# Patient Record
Sex: FEMALE | Race: WHITE | NOT HISPANIC OR LATINO | Employment: FULL TIME | URBAN - METROPOLITAN AREA
[De-identification: names, ages, dates, MRNs, and addresses within clinical notes are randomized per-mention and may not be internally consistent; named-entity substitution may affect disease eponyms.]

---

## 2018-10-11 ENCOUNTER — OFFICE VISIT CONVERTED (OUTPATIENT)
Dept: SURGERY | Facility: CLINIC | Age: 43
End: 2018-10-11
Attending: PHYSICIAN ASSISTANT

## 2018-10-11 ENCOUNTER — CONVERSION ENCOUNTER (OUTPATIENT)
Dept: SURGERY | Facility: CLINIC | Age: 43
End: 2018-10-11

## 2018-10-18 ENCOUNTER — CONVERSION ENCOUNTER (OUTPATIENT)
Dept: SURGERY | Facility: CLINIC | Age: 43
End: 2018-10-18

## 2019-04-15 ENCOUNTER — HOSPITAL ENCOUNTER (OUTPATIENT)
Dept: URGENT CARE | Facility: CLINIC | Age: 44
Discharge: HOME | End: 2019-04-15
Attending: FAMILY MEDICINE

## 2019-05-25 ENCOUNTER — HOSPITAL ENCOUNTER (OUTPATIENT)
Dept: URGENT CARE | Facility: CLINIC | Age: 44
Discharge: HOME | End: 2019-05-25

## 2019-05-27 LAB
AMOXICILLIN+CLAV SUSC ISLT: 4
BACTERIA UR CULT: ABNORMAL
CEFAZOLIN SUSC ISLT: >=64
CEFEPIME SUSC ISLT: <=1
CEFTAZIDIME SUSC ISLT: <=1
CEFTRIAXONE SUSC ISLT: <=1
CEFUROXIME ORAL SUSC ISLT: 4
CEFUROXIME PARENTER SUSC ISLT: 4
CIPROFLOXACIN SUSC ISLT: <=0.25
ERTAPENEM SUSC ISLT: <=0.5
GENTAMICIN SUSC ISLT: <=1
LEVOFLOXACIN SUSC ISLT: <=0.12
NITROFURANTOIN SUSC ISLT: 32
TETRACYCLINE SUSC ISLT: <=1
TMP SMX SUSC ISLT: <=20
TOBRAMYCIN SUSC ISLT: <=1

## 2020-01-05 ENCOUNTER — HOSPITAL ENCOUNTER (OUTPATIENT)
Dept: URGENT CARE | Facility: CLINIC | Age: 45
Discharge: HOME | End: 2020-01-05

## 2020-06-26 ENCOUNTER — HOSPITAL ENCOUNTER (OUTPATIENT)
Dept: URGENT CARE | Facility: CLINIC | Age: 45
Discharge: HOME | End: 2020-06-26
Attending: PHYSICIAN ASSISTANT

## 2021-05-16 VITALS — HEIGHT: 66 IN | WEIGHT: 158.5 LBS | RESPIRATION RATE: 12 BRPM | BODY MASS INDEX: 25.47 KG/M2

## 2021-05-16 VITALS — RESPIRATION RATE: 12 BRPM | WEIGHT: 160.25 LBS | HEIGHT: 66 IN | BODY MASS INDEX: 25.75 KG/M2

## 2023-04-06 ENCOUNTER — APPOINTMENT (OUTPATIENT)
Dept: CT IMAGING | Facility: HOSPITAL | Age: 48
End: 2023-04-06
Payer: COMMERCIAL

## 2023-04-06 ENCOUNTER — HOSPITAL ENCOUNTER (EMERGENCY)
Facility: HOSPITAL | Age: 48
Discharge: HOME | End: 2023-04-06
Attending: EMERGENCY MEDICINE | Admitting: EMERGENCY MEDICINE
Payer: COMMERCIAL

## 2023-04-06 VITALS
OXYGEN SATURATION: 100 % | DIASTOLIC BLOOD PRESSURE: 100 MMHG | RESPIRATION RATE: 16 BRPM | WEIGHT: 172.18 LBS | HEIGHT: 66 IN | HEART RATE: 80 BPM | SYSTOLIC BLOOD PRESSURE: 173 MMHG | BODY MASS INDEX: 27.67 KG/M2 | TEMPERATURE: 97.9 F

## 2023-04-06 DIAGNOSIS — R51.9 NONINTRACTABLE HEADACHE, UNSPECIFIED CHRONICITY PATTERN, UNSPECIFIED HEADACHE TYPE: Primary | ICD-10-CM

## 2023-04-06 PROCEDURE — 96374 THER/PROPH/DIAG INJ IV PUSH: CPT

## 2023-04-06 PROCEDURE — 25010000002 METOCLOPRAMIDE PER 10 MG: Performed by: EMERGENCY MEDICINE

## 2023-04-06 PROCEDURE — 99283 EMERGENCY DEPT VISIT LOW MDM: CPT

## 2023-04-06 PROCEDURE — 25010000002 DIPHENHYDRAMINE PER 50 MG: Performed by: EMERGENCY MEDICINE

## 2023-04-06 PROCEDURE — 25010000002 KETOROLAC TROMETHAMINE PER 15 MG: Performed by: EMERGENCY MEDICINE

## 2023-04-06 PROCEDURE — 96375 TX/PRO/DX INJ NEW DRUG ADDON: CPT

## 2023-04-06 PROCEDURE — 70450 CT HEAD/BRAIN W/O DYE: CPT

## 2023-04-06 RX ORDER — METOCLOPRAMIDE HYDROCHLORIDE 5 MG/ML
10 INJECTION INTRAMUSCULAR; INTRAVENOUS ONCE
Status: COMPLETED | OUTPATIENT
Start: 2023-04-06 | End: 2023-04-06

## 2023-04-06 RX ORDER — KETOROLAC TROMETHAMINE 30 MG/ML
30 INJECTION, SOLUTION INTRAMUSCULAR; INTRAVENOUS ONCE
Status: COMPLETED | OUTPATIENT
Start: 2023-04-06 | End: 2023-04-06

## 2023-04-06 RX ORDER — ACETAMINOPHEN 500 MG
1000 TABLET ORAL ONCE
Status: COMPLETED | OUTPATIENT
Start: 2023-04-06 | End: 2023-04-06

## 2023-04-06 RX ORDER — DIPHENHYDRAMINE HYDROCHLORIDE 50 MG/ML
12.5 INJECTION INTRAMUSCULAR; INTRAVENOUS ONCE
Status: COMPLETED | OUTPATIENT
Start: 2023-04-06 | End: 2023-04-06

## 2023-04-06 RX ORDER — KETOROLAC TROMETHAMINE 10 MG/1
10 TABLET, FILM COATED ORAL EVERY 6 HOURS PRN
Qty: 15 TABLET | Refills: 0 | Status: SHIPPED | OUTPATIENT
Start: 2023-04-06 | End: 2023-04-06

## 2023-04-06 RX ADMIN — DIPHENHYDRAMINE HYDROCHLORIDE 12.5 MG: 50 INJECTION, SOLUTION INTRAMUSCULAR; INTRAVENOUS at 10:11

## 2023-04-06 RX ADMIN — KETOROLAC TROMETHAMINE 30 MG: 30 INJECTION, SOLUTION INTRAMUSCULAR; INTRAVENOUS at 10:14

## 2023-04-06 RX ADMIN — ACETAMINOPHEN 1000 MG: 500 TABLET ORAL at 10:14

## 2023-04-06 RX ADMIN — SODIUM CHLORIDE 1000 ML: 9 INJECTION, SOLUTION INTRAVENOUS at 10:11

## 2023-04-06 RX ADMIN — METOCLOPRAMIDE 10 MG: 5 INJECTION, SOLUTION INTRAMUSCULAR; INTRAVENOUS at 10:13

## 2023-04-06 NOTE — ED PROVIDER NOTES
Time: 7:59 AM EDT  Date of encounter:  4/6/2023  Independent Historian/Clinical History and Information was obtained by:   Patient  Chief Complaint: headache    History is limited by: N/A    History of Present Illness:  Patient is a 47 y.o. year old female who presents to the emergency department for evaluation of headache, migraine for last few days. Patient states has hx of migraines and is now having trouble opening her eyes due to pressure and watery drainage.      History provided by:  Patient   used: No    Headache  Associated symptoms: no abdominal pain, no congestion, no cough, no diarrhea, no eye pain, no fever, no myalgias, no nausea, no seizures, no sore throat and no vomiting        Patient Care Team  Primary Care Provider: Provider, No Known    Past Medical History:     Allergies   Allergen Reactions   • Penicillins Hives and Rash     Past Medical History:   Diagnosis Date   • GERD (gastroesophageal reflux disease)      Past Surgical History:   Procedure Laterality Date   • APPENDECTOMY     • CHOLECYSTECTOMY     • TONSILLECTOMY     • WISDOM TOOTH EXTRACTION Bilateral      History reviewed. No pertinent family history.    Home Medications:  Prior to Admission medications    Medication Sig Start Date End Date Taking? Authorizing Provider   omeprazole (priLOSEC) 20 MG capsule Take 20 mg by mouth Daily.    Provider, Historical, MD        Social History:   Social History     Tobacco Use   • Smoking status: Never   • Smokeless tobacco: Never   Vaping Use   • Vaping Use: Never used   Substance Use Topics   • Alcohol use: Yes     Comment: social         Review of Systems:  Review of Systems   Constitutional: Negative for chills and fever.   HENT: Negative for congestion, rhinorrhea and sore throat.    Eyes: Positive for discharge (watery). Negative for pain and visual disturbance.        Pressure    Respiratory: Negative for apnea, cough, chest tightness and shortness of breath.   "  Cardiovascular: Negative for chest pain and palpitations.   Gastrointestinal: Negative for abdominal pain, diarrhea, nausea and vomiting.   Genitourinary: Negative for difficulty urinating and dysuria.   Musculoskeletal: Negative for joint swelling and myalgias.   Skin: Negative for color change.   Neurological: Positive for headaches. Negative for seizures.   Psychiatric/Behavioral: Negative.    All other systems reviewed and are negative.       Physical Exam:  /100 (BP Location: Right arm, Patient Position: Sitting)   Pulse 80   Temp 97.9 °F (36.6 °C) (Oral)   Resp 16   Ht 167.6 cm (66\")   Wt 78.1 kg (172 lb 2.9 oz)   LMP 03/22/2023   SpO2 100%   BMI 27.79 kg/m²     Physical Exam  Vitals and nursing note reviewed.   Constitutional:       General: She is not in acute distress.     Appearance: Normal appearance. She is not toxic-appearing.   HENT:      Head: Normocephalic and atraumatic.      Jaw: There is normal jaw occlusion.   Eyes:      General: Lids are normal.      Extraocular Movements: Extraocular movements intact.      Conjunctiva/sclera: Conjunctivae normal.      Pupils: Pupils are equal, round, and reactive to light.   Cardiovascular:      Rate and Rhythm: Normal rate and regular rhythm.      Pulses: Normal pulses.      Heart sounds: Normal heart sounds.   Pulmonary:      Effort: Pulmonary effort is normal. No respiratory distress.      Breath sounds: Normal breath sounds. No wheezing or rhonchi.   Abdominal:      General: Abdomen is flat.      Palpations: Abdomen is soft.      Tenderness: There is no abdominal tenderness. There is no guarding or rebound.   Musculoskeletal:         General: Normal range of motion.      Cervical back: Normal range of motion and neck supple.      Right lower leg: No edema.      Left lower leg: No edema.   Skin:     General: Skin is warm and dry.   Neurological:      Mental Status: She is alert and oriented to person, place, and time. Mental status is at " baseline.   Psychiatric:         Mood and Affect: Mood normal.                  Procedures:  Procedures      Medical Decision Making:      Comorbidities that affect care:    Migraine headache    External Notes reviewed:    Previous Clinic Note: Patient was recently seen for urinary tract infection by her PCP.      The following orders were placed and all results were independently analyzed by me:  Orders Placed This Encounter   Procedures   • CT Head Without Contrast       Medications Given in the Emergency Department:  Medications   ketorolac (TORADOL) injection 30 mg (30 mg Intravenous Given 4/6/23 1014)   acetaminophen (TYLENOL) tablet 1,000 mg (1,000 mg Oral Given 4/6/23 1014)   sodium chloride 0.9 % bolus 1,000 mL (1,000 mL Intravenous New Bag 4/6/23 1011)   metoclopramide (REGLAN) injection 10 mg (10 mg Intravenous Given 4/6/23 1013)   diphenhydrAMINE (BENADRYL) injection 12.5 mg (12.5 mg Intravenous Given 4/6/23 1011)        ED Course:         Labs:    Lab Results (last 24 hours)     ** No results found for the last 24 hours. **           Imaging:    CT Head Without Contrast    Result Date: 4/6/2023  PROCEDURE: CT HEAD WO CONTRAST  COMPARISON:  None  INDICATIONS: headache  PROTOCOL:   Standard imaging protocol performed    RADIATION:   DLP: 954.5mGy*cm   MA and/or KV was adjusted to minimize radiation dose.    TECHNIQUE: CT images were obtained without non-ionic intravenous contrast material.  FINDINGS:  The ventricles are normal in size, position, and configuration.  Sulci are not abnormally prominent.  No abnormal gray or white matter density is appreciated.  There is no CT evidence of acute intracranial hemorrhage, mass, or mass effect.  The orbits have a normal appearance.  The paranasal sinuses, middle ears, and mastoid air cells are well aerated.       Negative CT scan of the head without IV contrast.     CHAPINCITO LAURENT MD       Electronically Signed and Approved By: CHAPINCITO LAURENT MD on 4/06/2023 at  10:15                 Differential Diagnosis and Discussion:    Headache: Differential diagnosis includes but is not limited to migraine, cluster headache, hypertension, tumor, subarachnoid bleeding, pseudotumor cerebri, temporal arteritis, infections, tension headache, and TMJ syndrome.    CT scan radiology interpretation was reviewed by me.    MDM  Number of Diagnoses or Management Options  Nonintractable headache, unspecified chronicity pattern, unspecified headache type  Diagnosis management comments: Based on the patient's presentation of a headache and history of migraines, I conducted a thorough evaluation of her condition, which included a physical examination and a CT scan of her head. The patient reported having this migraine for approximately 5 days without any subjective neurological deficits. The CT scan did not show any acute intracranial abnormalities.    The differential diagnoses for headache include but are not limited to:    Migraine  Cluster headache  Tension headache  Sinus headache  Trigeminal neuralgia  Temporal arteritis  Head injury  Brain tumor  After administering Toradol, the patient reported cessation of the headache, and the re-examination showed no abnormal findings. Based on the patient's history and the response to Toradol, it is likely that the patient had a migraine.    I believe that the patient may be currently experiencing an ocular migraine, which is a type of migraine that involves visual symptoms without the presence of a headache. The signs and symptoms of an ocular migraine can include:    Visual disturbances, such as flashes of light, blind spots, or zigzag patterns  Numbness or tingling in the face, arms, or legs  Headache (although not always present)  Sensitivity to light or sound  Difficulty speaking or understanding speech  Based on the patient's current clinical presentation and the response to treatment, the patient is stable and suitable for discharge. However, I  strongly advise the patient to follow up with a neurologist for further evaluation and management of her condition. We will provide the patient with appropriate instructions and referrals for follow-up care, as well as a contact number for any questions or concerns she may have.       Amount and/or Complexity of Data Reviewed  Tests in the radiology section of CPT®: reviewed  Independent visualization of images, tracings, or specimens: yes    Risk of Complications, Morbidity, and/or Mortality  Presenting problems: moderate  Management options: moderate    Patient Progress  Patient progress: stable           Patient Care Considerations:    MRI: I considered ordering an MRI however Patient does report cessation of headache and has a normal CT scan.      Consultants/Shared Management Plan:    None    Social Determinants of Health:    Patient is independent, reliable, and has access to care.       Disposition and Care Coordination:    Discharged: The patient is suitable and stable for discharge with no need for consideration of observation or admission.    I have explained the patient´s condition, diagnoses and treatment plan based on the information available to me at this time. I have answered questions and addressed any concerns. The patient has a good  understanding of the patient´s diagnosis, condition, and treatment plan as can be expected at this point. The vital signs have been stable. The patient´s condition is stable and appropriate for discharge from the emergency department.      The patient will pursue further outpatient evaluation with the primary care physician or other designated or consulting physician as outlined in the discharge instructions. They are agreeable to this plan of care and follow-up instructions have been explained in detail. The patient has received these instructions in written format and have expressed an understanding of the discharge instructions. The patient is aware that any  significant change in condition or worsening of symptoms should prompt an immediate return to this or the closest emergency department or call to 911.  I have explained discharge medications and the need for follow up with the patient/caretakers. This was also printed in the discharge instructions. Patient was discharged with the following medications and follow up:      Medication List      New Prescriptions    ketorolac 10 MG tablet  Commonly known as: TORADOL  Take 1 tablet by mouth Every 6 (Six) Hours As Needed for Moderate Pain.           Where to Get Your Medications      These medications were sent to [id number] - [place], [place] - [address] AT [facility] - [phone]  - [phone] FX  [address] [place] [address]    Phone: [phone]   · ketorolac 10 MG tablet      Oropilla, Jose David Bryan MD  [address]  [place] [id number] [phone]    In 2 days         Final diagnoses:   Nonintractable headache, unspecified chronicity pattern, unspecified headache type        ED Disposition     ED Disposition   Discharge    Condition   Stable    Comment   --             This medical record created using voice recognition software.        Documentation assistance provided by Sidney Johnston acting as scribe for Usman Vasquez MD. Information recorded by the scribe was done at my direction and has been verified and validated by me.          Sidney Johnston  04/06/23 0759       Sidney Johnston  04/06/23 0829       Sidney Johnston  04/06/23 0833       Usman Vasquez MD  04/06/23 2738

## 2023-09-01 DIAGNOSIS — R30.0 DYSURIA: Primary | ICD-10-CM

## 2023-09-01 DIAGNOSIS — R30.0 DYSURIA: ICD-10-CM

## 2023-09-01 PROCEDURE — 87086 URINE CULTURE/COLONY COUNT: CPT | Performed by: NURSE PRACTITIONER

## 2023-09-01 PROCEDURE — 87077 CULTURE AEROBIC IDENTIFY: CPT | Performed by: NURSE PRACTITIONER

## 2023-09-01 PROCEDURE — 87186 SC STD MICRODIL/AGAR DIL: CPT | Performed by: NURSE PRACTITIONER

## 2023-09-01 RX ORDER — PHENAZOPYRIDINE HYDROCHLORIDE 200 MG/1
200 TABLET, FILM COATED ORAL 3 TIMES DAILY PRN
Qty: 6 TABLET | Refills: 0 | Status: SHIPPED | OUTPATIENT
Start: 2023-09-01

## 2023-09-03 ENCOUNTER — DOCUMENTATION (OUTPATIENT)
Dept: UROLOGY | Facility: CLINIC | Age: 48
End: 2023-09-03
Payer: COMMERCIAL

## 2023-09-03 DIAGNOSIS — R30.0 DYSURIA: Primary | ICD-10-CM

## 2023-09-03 LAB — BACTERIA SPEC AEROBE CULT: ABNORMAL

## 2023-09-03 RX ORDER — SULFAMETHOXAZOLE AND TRIMETHOPRIM 800; 160 MG/1; MG/1
1 TABLET ORAL 2 TIMES DAILY
Qty: 20 TABLET | Refills: 0 | Status: SHIPPED | OUTPATIENT
Start: 2023-09-03 | End: 2023-09-13

## 2023-10-16 ENCOUNTER — OFFICE VISIT (OUTPATIENT)
Dept: FAMILY MEDICINE CLINIC | Facility: CLINIC | Age: 48
End: 2023-10-16
Payer: COMMERCIAL

## 2023-10-16 ENCOUNTER — TRANSCRIBE ORDERS (OUTPATIENT)
Dept: ADMINISTRATIVE | Facility: HOSPITAL | Age: 48
End: 2023-10-16
Payer: COMMERCIAL

## 2023-10-16 ENCOUNTER — LAB (OUTPATIENT)
Dept: LAB | Facility: HOSPITAL | Age: 48
End: 2023-10-16
Payer: COMMERCIAL

## 2023-10-16 VITALS
DIASTOLIC BLOOD PRESSURE: 98 MMHG | HEART RATE: 106 BPM | BODY MASS INDEX: 29.41 KG/M2 | HEIGHT: 66 IN | SYSTOLIC BLOOD PRESSURE: 140 MMHG | WEIGHT: 183 LBS | OXYGEN SATURATION: 97 %

## 2023-10-16 DIAGNOSIS — G43.009 MIGRAINE WITHOUT AURA AND WITHOUT STATUS MIGRAINOSUS, NOT INTRACTABLE: ICD-10-CM

## 2023-10-16 DIAGNOSIS — E66.3 OVERWEIGHT (BMI 25.0-29.9): ICD-10-CM

## 2023-10-16 DIAGNOSIS — R03.0 ELEVATED BP WITHOUT DIAGNOSIS OF HYPERTENSION: ICD-10-CM

## 2023-10-16 DIAGNOSIS — Z12.31 BREAST CANCER SCREENING BY MAMMOGRAM: Primary | ICD-10-CM

## 2023-10-16 DIAGNOSIS — Z12.11 ENCOUNTER FOR SCREENING COLONOSCOPY: ICD-10-CM

## 2023-10-16 DIAGNOSIS — Z12.31 SCREENING MAMMOGRAM FOR BREAST CANCER: ICD-10-CM

## 2023-10-16 DIAGNOSIS — R00.2 PALPITATIONS: ICD-10-CM

## 2023-10-16 DIAGNOSIS — Z11.59 ENCOUNTER FOR HEPATITIS C SCREENING TEST FOR LOW RISK PATIENT: ICD-10-CM

## 2023-10-16 DIAGNOSIS — R00.2 PALPITATIONS: Primary | ICD-10-CM

## 2023-10-16 DIAGNOSIS — K21.9 GASTROESOPHAGEAL REFLUX DISEASE WITHOUT ESOPHAGITIS: ICD-10-CM

## 2023-10-16 PROBLEM — G43.909 MIGRAINE: Status: ACTIVE | Noted: 2023-10-16

## 2023-10-16 LAB
ALBUMIN SERPL-MCNC: 4.7 G/DL (ref 3.5–5.2)
ALBUMIN/GLOB SERPL: 1.5 G/DL
ALP SERPL-CCNC: 63 U/L (ref 39–117)
ALT SERPL W P-5'-P-CCNC: 31 U/L (ref 1–33)
ANION GAP SERPL CALCULATED.3IONS-SCNC: 11.1 MMOL/L (ref 5–15)
AST SERPL-CCNC: 28 U/L (ref 1–32)
BASOPHILS # BLD AUTO: 0.06 10*3/MM3 (ref 0–0.2)
BASOPHILS NFR BLD AUTO: 1 % (ref 0–1.5)
BILIRUB SERPL-MCNC: 0.2 MG/DL (ref 0–1.2)
BUN SERPL-MCNC: 12 MG/DL (ref 6–20)
BUN/CREAT SERPL: 15.2 (ref 7–25)
CALCIUM SPEC-SCNC: 9.9 MG/DL (ref 8.6–10.5)
CHLORIDE SERPL-SCNC: 105 MMOL/L (ref 98–107)
CHOLEST SERPL-MCNC: 300 MG/DL (ref 0–200)
CO2 SERPL-SCNC: 23.9 MMOL/L (ref 22–29)
CREAT SERPL-MCNC: 0.79 MG/DL (ref 0.57–1)
DEPRECATED RDW RBC AUTO: 43 FL (ref 37–54)
EGFRCR SERPLBLD CKD-EPI 2021: 91.8 ML/MIN/1.73
EOSINOPHIL # BLD AUTO: 0.06 10*3/MM3 (ref 0–0.4)
EOSINOPHIL NFR BLD AUTO: 1 % (ref 0.3–6.2)
ERYTHROCYTE [DISTWIDTH] IN BLOOD BY AUTOMATED COUNT: 14.2 % (ref 12.3–15.4)
GLOBULIN UR ELPH-MCNC: 3.1 GM/DL
GLUCOSE SERPL-MCNC: 103 MG/DL (ref 65–99)
HBA1C MFR BLD: 5.9 % (ref 4.8–5.6)
HCT VFR BLD AUTO: 36 % (ref 34–46.6)
HCV AB SER DONR QL: NORMAL
HDLC SERPL-MCNC: 58 MG/DL (ref 40–60)
HGB BLD-MCNC: 11.7 G/DL (ref 12–15.9)
IMM GRANULOCYTES # BLD AUTO: 0.03 10*3/MM3 (ref 0–0.05)
IMM GRANULOCYTES NFR BLD AUTO: 0.5 % (ref 0–0.5)
LDLC SERPL CALC-MCNC: 207 MG/DL (ref 0–100)
LDLC/HDLC SERPL: 3.54 {RATIO}
LYMPHOCYTES # BLD AUTO: 1.68 10*3/MM3 (ref 0.7–3.1)
LYMPHOCYTES NFR BLD AUTO: 27 % (ref 19.6–45.3)
MAGNESIUM SERPL-MCNC: 2.3 MG/DL (ref 1.6–2.6)
MCH RBC QN AUTO: 27.3 PG (ref 26.6–33)
MCHC RBC AUTO-ENTMCNC: 32.5 G/DL (ref 31.5–35.7)
MCV RBC AUTO: 84.1 FL (ref 79–97)
MONOCYTES # BLD AUTO: 0.55 10*3/MM3 (ref 0.1–0.9)
MONOCYTES NFR BLD AUTO: 8.8 % (ref 5–12)
NEUTROPHILS NFR BLD AUTO: 3.85 10*3/MM3 (ref 1.7–7)
NEUTROPHILS NFR BLD AUTO: 61.7 % (ref 42.7–76)
NRBC BLD AUTO-RTO: 0 /100 WBC (ref 0–0.2)
PLATELET # BLD AUTO: 311 10*3/MM3 (ref 140–450)
PMV BLD AUTO: 9.7 FL (ref 6–12)
POTASSIUM SERPL-SCNC: 4.5 MMOL/L (ref 3.5–5.2)
PROT SERPL-MCNC: 7.8 G/DL (ref 6–8.5)
RBC # BLD AUTO: 4.28 10*6/MM3 (ref 3.77–5.28)
SODIUM SERPL-SCNC: 140 MMOL/L (ref 136–145)
T4 FREE SERPL-MCNC: 0.99 NG/DL (ref 0.93–1.7)
TRIGL SERPL-MCNC: 183 MG/DL (ref 0–150)
TSH SERPL DL<=0.05 MIU/L-ACNC: 2.21 UIU/ML (ref 0.27–4.2)
VLDLC SERPL-MCNC: 35 MG/DL (ref 5–40)
WBC NRBC COR # BLD: 6.23 10*3/MM3 (ref 3.4–10.8)

## 2023-10-16 PROCEDURE — 83036 HEMOGLOBIN GLYCOSYLATED A1C: CPT

## 2023-10-16 PROCEDURE — 80061 LIPID PANEL: CPT

## 2023-10-16 PROCEDURE — 83735 ASSAY OF MAGNESIUM: CPT

## 2023-10-16 PROCEDURE — 36415 COLL VENOUS BLD VENIPUNCTURE: CPT

## 2023-10-16 PROCEDURE — 84439 ASSAY OF FREE THYROXINE: CPT

## 2023-10-16 PROCEDURE — 86803 HEPATITIS C AB TEST: CPT

## 2023-10-16 PROCEDURE — 80050 GENERAL HEALTH PANEL: CPT

## 2023-10-16 RX ORDER — FAMOTIDINE 20 MG/1
20 TABLET, FILM COATED ORAL 2 TIMES DAILY
Qty: 60 TABLET | Refills: 2 | Status: SHIPPED | OUTPATIENT
Start: 2023-10-16

## 2023-10-16 NOTE — PROGRESS NOTES
Subjective:     Tali Mcduffie is a 48 y.o. female with a concurrent medical history of gastroesophageal reflux disease who presents to discuss heart palpitations and headaches.    Ms. Orta complains of intermittent heart palpitations of two weeks duration. She is not a smoker.  She denies known heart condition but does say she has been treated with medication in the past for similar palpitations though she is unable to recall the name of this medicine.  Heart rate initially 126, decreased to 106 on recheck.  Tachycardic on physical exam.  Denies syncope, chest pain, shortness of breath, lightheadedness, weakness, fatigue.    ECG obtained in clinic and demonstrated sinus rhythm with left bundle branch block.  Will obtain magnesium and thyroid studies. Will order 48-hour Holter monitor provide patient with referral to cardiology at her request.    I am starting patient on metoprolol for migraines and this may incidentally help with heart rate as well.  I will tell her to wait to start taking this medication until after her Holter monitor.    Patient does complain of headaches.  She tells me she was diagnosed with migraine in 2001 and has been on multiple medications but is unable to remember what medications she was prescribed.  She says her headaches have been becoming more frequent lately and now she has up to 2/week.  She denies photophobia, sensitivity to sound, nausea and vomiting.  Sleep does not help she can go to bed and still wake up with a headache.  She does not have any aura.  The headaches can last for several hours at a time but do not have any cluster pattern including lacrimation or tearing.  The headaches can be pulsatile in nature.  She believes she has been on Topamax for prophylaxis but is not quite sure and she does not think this made a difference.  She had been on Imitrex for abortive therapy that would make the headache go away but would result in a migraine headache.  She usually  self medicates when she feels a headache coming out with Tylenol and monster.    Given her concomitant tachycardia and slightly elevated blood pressure today, would start with metoprolol tartrate 25 mg twice daily.  Discussed potential side effects risk and benefits of this medication with patient.  She is to wait to start taking until after her Holter monitor so it does not affect the study.  We will see back in 1 month.  If patient does have some improvement, would consider increasing dose if needed versus without improvement switching to a different medication.     Headaches do not fit typical migraine pattern as she has complete absence of photophobia, phonophobia, and nausea or vomiting. May warrant consideration of trycyclic for chronic tension headaches if no benefit from metoprolol.     We discussed red flag symptoms that would be reason for urgent imaging. Patient had CT in 4/06/2023 that was normal.     Patient did have elevated BP.  Initial systolic value was over 150 and on recheck improved to 140.  Chronically elevated blood pressure can cause headaches and I did recommend to patient to monitor her blood pressure in the ambulatory setting as she does attribute elevation of blood pressure to whitecoat hypertension.  If blood pressure is consistently elevated outside of this office setting and remains elevated at next visit, it would be worth revisiting potential for diagnosis of hypertension though I think that since we are starting her on metoprolol blood pressure may improve incidentally with this as well.    Gastroesophageal reflux disease - Patient has been on omeprazole for some time.  We did discuss the risk of osteoporosis with long-term PPI use and patient did elect to try to come off this medicine and have trial of Pepcid instead.  Prescription sent.    Care gaps include Pap smear and we will get her set up with [facility] in our office for this.    Care gaps include screening colonoscopy and patient  is agreeable to obtain this.  We will get her set up with a referral for general surgery for colonoscopy today.    Care gaps include hepatitis C screening and we will obtain this today.    The following portions of the patient's history were reviewed and updated as appropriate: allergies, current medications, past family history, past medical history, past social history, past surgical history, and problem list.    Past Medical Hx:  Past Medical History:   Diagnosis Date    GERD (gastroesophageal reflux disease)        Past Surgical Hx:  Past Surgical History:   Procedure Laterality Date    APPENDECTOMY      CHOLECYSTECTOMY      TONSILLECTOMY      WISDOM TOOTH EXTRACTION Bilateral        Current Meds:    Current Outpatient Medications:     famotidine (Pepcid) 20 MG tablet, Take 1 tablet by mouth 2 (Two) Times a Day., Disp: 60 tablet, Rfl: 2    metoprolol tartrate (LOPRESSOR) 25 MG tablet, Take 1 tablet by mouth 2 (Two) Times a Day., Disp: 60 tablet, Rfl: 1    Allergies:  Allergies   Allergen Reactions    Penicillins Hives and Rash       Family Hx:  No family history on file.     Social History:  Social History     Socioeconomic History    Marital status: Single   Tobacco Use    Smoking status: Never    Smokeless tobacco: Never   Vaping Use    Vaping Use: Never used   Substance and Sexual Activity    Alcohol use: Yes     Comment: social    Drug use: Not Currently    Sexual activity: Defer       Review of Systems  Review of Systems   Constitutional:  Positive for fatigue. Negative for chills and fever.   Eyes:  Negative for photophobia.   Respiratory:  Negative for shortness of breath.    Cardiovascular:  Negative for chest pain.   Gastrointestinal:  Negative for abdominal pain, nausea and vomiting.   Endocrine: Negative for polydipsia, polyphagia and polyuria.   Genitourinary:  Negative for difficulty urinating and dysuria.   Neurological:  Positive for headaches. Negative for dizziness, syncope, weakness and  "light-headedness.       Objective:     /98   Pulse 106   Ht 167.6 cm (66\")   Wt 83 kg (183 lb)   LMP 10/12/2023 (Exact Date)   SpO2 97%   BMI 29.54 kg/m²   Physical Exam  Constitutional:       General: She is not in acute distress.     Appearance: Normal appearance. She is not ill-appearing, toxic-appearing or diaphoretic.   HENT:      Head: Normocephalic and atraumatic.   Eyes:      Extraocular Movements: Extraocular movements intact.      Pupils: Pupils are equal, round, and reactive to light.   Cardiovascular:      Rate and Rhythm: Regular rhythm. Tachycardia present.   Pulmonary:      Effort: Pulmonary effort is normal.      Breath sounds: Normal breath sounds.   Musculoskeletal:      Cervical back: Normal range of motion.   Skin:     General: Skin is warm and dry.   Neurological:      Mental Status: She is alert.   Psychiatric:         Mood and Affect: Mood normal.         Behavior: Behavior normal.          Assessment/Plan:     Diagnoses and all orders for this visit:    1. Palpitations (Primary)    Ms. Orta complains of intermittent heart palpitations of two weeks duration. She is not a smoker.  She denies known heart condition but does say she has been treated with medication in the past for similar palpitations though she is unable to recall the name of this medicine.  Heart rate initially 126, decreased to 106 on recheck.  Tachycardic on physical exam.  Denies syncope, chest pain, shortness of breath, lightheadedness, weakness, fatigue.    ECG obtained in clinic and demonstrated sinus rhythm with left bundle branch block.  Will obtain magnesium and thyroid studies. Will order 48-hour Holter monitor provide patient with referral to cardiology at her request. No prior EKG data available.     I am starting patient on metoprolol for migraines and this may incidentally help with heart rate as well.  I will tell her to wait to start taking this medication until after her Holter " monitor.    -Cardiology referral   -     TSH+Free T4; Future  -     ECG 12 Lead  -     Magnesium; Future  -     CBC w AUTO Differential; Future  -     Holter monitor - 48 hour; Future    2. Overweight (BMI 25.0-29.9)    Given BMI, will obtain lab work to screen for diabetes and cholesterol.     -     Hemoglobin A1c; Future  -     Lipid panel; Future    3. Migraine without aura and without status migrainosus, not intractable    Patient does complain of headaches.  She tells me she was diagnosed with migraine in 2001 and has been on multiple medications but is unable to remember what medications she was prescribed.  She says her headaches have been becoming more frequent lately and now she has up to 2/week.  She denies photophobia, sensitivity to sound, nausea and vomiting.  Sleep does not help she can go to bed and still wake up with a headache.  She does not have any aura.  The headaches can last for several hours at a time but do not have any cluster pattern including lacrimation or tearing.  The headaches can be pulsatile in nature.  She believes she has been on Topamax for prophylaxis but is not quite sure and she does not think this made a difference.  She had been on Imitrex for abortive therapy that would make the headache go away but would result in a migraine headache.  She usually self medicates when she feels a headache coming out with Tylenol and monster.    Given her concomitant tachycardia and slightly elevated blood pressure today, would start with metoprolol tartrate 25 mg twice daily.  Discussed potential side effects risk and benefits of this medication with patient.  She is to wait to start taking until after her Holter monitor so it does not affect the study.  We will see back in 1 month.  If patient does have some improvement, would consider increasing dose if needed versus without improvement switching to a different medication.     Headaches do not fit typical migraine pattern as she has  complete absence of photophobia, phonophobia, and nausea or vomiting. May warrant consideration of trycyclic for chronic tension headaches if no benefit from metoprolol.     We discussed red flag symptoms that would be reason for urgent imaging. Patient had CT in 4/06/2023 that was normal.     -     Comprehensive metabolic panel; Future  -     metoprolol tartrate (LOPRESSOR) 25 MG tablet; Take 1 tablet by mouth 2 (Two) Times a Day.  Dispense: 60 tablet; Refill: 1    4. Encounter for hepatitis C screening test for low risk patient  Care gaps include hepatitis C screening and we will obtain this today.    -     Hepatitis C antibody; Future    5. Encounter for screening colonoscopy  Care gaps include screening colonoscopy and patient is agreeable to obtain this.  We will get her set up with a referral for general surgery for colonoscopy today.    -     Ambulatory Referral For Screening Colonoscopy    6. Screening mammogram for breast cancer  Patient requests screening mammogram to be ordered for when she turns 49 next year. Have placed order.     -     Mammo Screening Bilateral With CAD; Future    7. Gastroesophageal reflux disease without esophagitis    Gastroesophageal reflux disease - Patient has been on omeprazole for some time.  We did discuss the risk of osteoporosis with long-term PPI use and patient did elect to try to come off this medicine and have trial of Pepcid instead.  Prescription sent.    -     famotidine (Pepcid) 20 MG tablet; Take 1 tablet by mouth 2 (Two) Times a Day.  Dispense: 60 tablet; Refill: 2    8. Elevated BP without diagnosis of hypertension    Patient did have elevated BP.  Initial systolic value was over 150 and on recheck improved to 140.  Chronically elevated blood pressure can cause headaches and I did recommend to patient to monitor her blood pressure in the ambulatory setting as she does attribute elevation of blood pressure to whitecoat hypertension.  If blood pressure is  consistently elevated outside of this office setting and remains elevated at next visit, it would be worth revisiting potential for diagnosis of hypertension though I think that since we are starting her on metoprolol blood pressure may improve incidentally with this as well.              Rx changes: Starting metoprolol 25 mg BID for migraine prophylaxis, discontinuing PPI and starting Pepcid twice daily for GERD    Follow-up:     Return in about 1 month (around 11/16/2023) for Migraines .    Preventative:  Health Maintenance   Topic Date Due    BMI FOLLOWUP  Never done    COLORECTAL CANCER SCREENING  Never done    HEPATITIS C SCREENING  Never done    ANNUAL PHYSICAL  Never done    PAP SMEAR  Never done    INFLUENZA VACCINE  08/01/2023    COVID-19 Vaccine (1) 10/17/2023 (Originally 1975)    TDAP/TD VACCINES (2 - Td or Tdap) 05/09/2026    Pneumococcal Vaccine 0-64  Aged Out           This document has been electronically signed by Navneet Moreno MD on October 16, 2023 09:54 EDT       Parts of this note are electronic transcriptions/translations of spoken language to printed text using the Dragon Dictation system.

## 2023-10-17 ENCOUNTER — TELEPHONE (OUTPATIENT)
Dept: FAMILY MEDICINE CLINIC | Facility: CLINIC | Age: 48
End: 2023-10-17

## 2023-10-17 DIAGNOSIS — R73.03 PREDIABETES: Primary | ICD-10-CM

## 2023-10-17 DIAGNOSIS — E78.2 MIXED HYPERLIPIDEMIA: Primary | ICD-10-CM

## 2023-10-17 DIAGNOSIS — D64.9 NORMOCYTIC ANEMIA: ICD-10-CM

## 2023-10-17 RX ORDER — ATORVASTATIN CALCIUM 40 MG/1
40 TABLET, FILM COATED ORAL DAILY
Qty: 90 TABLET | Refills: 1 | Status: SHIPPED | OUTPATIENT
Start: 2023-10-17

## 2023-10-17 NOTE — TELEPHONE ENCOUNTER
Caller: Nay Mcduffie    Relationship: Self    Best call back number: [phone]    What is the best time to reach you: ANYTIME AFTER 4:30     Who are you requesting to speak with (clinical staff, provider,  specific staff member): MAN OR CHERRY    Do you know the name of the person who called: NAY     What was the call regarding: WANTING TO KNOW IF SHE NEEDS TO DO LABS BEFORE THE 1 MONTH FOLLOW UP OR IN THREE MONTHS     Is it okay if the provider responds through [facility]: YES AND WANT TO BE REACH BY [employer] OR [employer]

## 2023-10-17 NOTE — PROGRESS NOTES
I called patient and discussed labwork.    ASCVD risk calculator recommends high intensity statin due to high LDL. We discussed risks and benefits of treatment and will start atorvastatin 40 mg today.     Patient in prediabetic range. We will try lifestyle and dietary modifications and when she sees me at her next appointment next month, we will start metformin if she has tolerated the statin and does not require an adjustment on this medication.     Patient does have slight anemia but is having menstrual cycles. Encouraged increased iron intake and would recheck in three months.     ?  This document has been electronically signed by Navneet Moreno MD on October 17, 2023 08:10 EDT

## 2023-10-17 NOTE — TELEPHONE ENCOUNTER
UNABLE TO TRANSFER TO OFFICE    PATIENT STATES SHE HAS RECEIVED ANOTHER CALL FROM THE OFFICE WITH NO MESSAGE.   PATIENT STATES SHE IS IN CLINICS AND CAN'T ANSWER THE PHONE. PATIENT STATES TO MESSAGE HER IN [employer] OR CALL AFTER 3:30 PM.

## 2023-10-17 NOTE — TELEPHONE ENCOUNTER
Caller: Tali Mcduffie    Relationship to patient: Self    Best call back number: [phone]     Patient is needing: PATIENT HAS SEEN LAB ORDERS ARE IN HER CHART. PATIENT WOULD LIKE A MESSAGE VIA [employer] STATING WHEN SHE SHOULD GET THESE LABS DONE.

## 2023-10-18 ENCOUNTER — OFFICE VISIT (OUTPATIENT)
Dept: CARDIOLOGY | Facility: CLINIC | Age: 48
End: 2023-10-18
Payer: COMMERCIAL

## 2023-10-18 VITALS
HEIGHT: 66 IN | WEIGHT: 184.2 LBS | HEART RATE: 94 BPM | DIASTOLIC BLOOD PRESSURE: 96 MMHG | BODY MASS INDEX: 29.6 KG/M2 | SYSTOLIC BLOOD PRESSURE: 143 MMHG

## 2023-10-18 DIAGNOSIS — E78.2 HYPERLIPEMIA, MIXED: ICD-10-CM

## 2023-10-18 DIAGNOSIS — R00.2 PALPITATIONS: Primary | ICD-10-CM

## 2023-10-18 PROCEDURE — 99203 OFFICE O/P NEW LOW 30 MIN: CPT | Performed by: SPECIALIST

## 2023-10-18 RX ORDER — METOPROLOL SUCCINATE 25 MG/1
25 TABLET, EXTENDED RELEASE ORAL DAILY
Qty: 90 TABLET | Refills: 3 | Status: SHIPPED | OUTPATIENT
Start: 2023-10-18

## 2023-10-18 NOTE — PROGRESS NOTES
[facility]   Cardiology Consult Note    Patient Name: Tali Mcduffie  : 1975  Referring Physician: Navneet Moreno MD  Subjective   Subjective     Reason for Consult/ Chief Complaint:   Chief Complaint   Patient presents with    Palpitations       HPI:  Tali Mcduffie is a 48 y.o. female with history of palpitations on and off for several years.  Palpitations lasted a few seconds relieved spontaneously.  No syncopal or presyncopal episode.  Recently her palpitations are got worse.  No chest pain.    Review of Systems:    Constitutional no fever,  no weight loss   Skin no rash   Otolaryngeal no difficulty swallowing   Cardiovascular See HPI   Pulmonary no cough, no sputum production   Gastrointestinal no constipation, no diarrhea   Genitourinary no dysuria, no hematuria   Hematologic no easy bruisability, no abnormal bleeding   Musculoskeletal no muscle pain   Neurologic no dizziness, no falls       Personal History     Past Medical History:  Past Medical History:   Diagnosis Date    Cataract     GERD (gastroesophageal reflux disease)     Glaucoma     History of medical problems     Hypertension     Kidney stone        Family History:   Family History   Problem Relation Age of Onset    Diabetes Mother     Heart disease Father     Other Sister        Social History:  reports that she has never smoked. She has never used smokeless tobacco. She reports current alcohol use. She reports that she does not currently use drugs.    Home Medications:  atorvastatin, famotidine, and metoprolol tartrate    Allergies:  Allergies   Allergen Reactions    Penicillins Hives and Rash       Objective    Objective     Vitals:   Heart Rate:  [94] 94  BP: (143)/(96) 143/96  Body mass index is 29.73 kg/m².  PHYSICAL EXAM:    General Appearance:   well developed  well nourished  HENT:   oropharynx moist  lips not cyanotic  Neck:  thyroid not enlarged  supple  Respiratory:  no respiratory distress  normal breath  sounds  no rales  Cardiovascular:  no jugular venous distention  regular rhythm  apical impulse normal  S1 normal, S2 normal  no S3, no S4   no murmur  no rub, no thrill  carotid pulses normal; no bruit  pedal pulses normal  lower extremity edema: none    Skin:   warm, dry  Psychiatric:  judgement and insight appropriate  normal mood and affect    RESULTS:    EKG reviewed by me and shows sinus rhythm with left bundle branch block pattern.       Result Review    Result Review:  I have personally reviewed the available results:  [x]  Laboratory  [x]  EKG/Telemetry   [x]  Cardiology/Vascular   [x] Medications  [x]  Old records  Lab Results   Component Value Date    CHOL 300 (H) 10/16/2023     Lab Results   Component Value Date    TRIG 183 (H) 10/16/2023     Lab Results   Component Value Date    HDL 58 10/16/2023     Lab Results   Component Value Date     (H) 10/16/2023     Lab Results   Component Value Date    VLDL 35 10/16/2023     Procedures     Impression/Plan  1. Palpitations: We will change metoprolol to Toprol-XL 25 mg once a day.  24-hour Holter.  Echocardiogram.  2.  Abnormal EKG: Echocardiogram.  Sestamibi stress test if patient is developing symptoms such as chest pain.  3.  Hyperlipidemia: Low-fat diet and exercise.  Continue Lipitor 40 mg once a day.  Monitor lipid and hepatic profile.        Electronically signed by Baltazar Preciado MD, 10/18/23, 8:46 AM EDT.

## 2023-10-20 ENCOUNTER — TELEPHONE (OUTPATIENT)
Dept: UROLOGY | Facility: CLINIC | Age: 48
End: 2023-10-20
Payer: COMMERCIAL

## 2023-10-20 ENCOUNTER — PATIENT ROUNDING (BHMG ONLY) (OUTPATIENT)
Dept: FAMILY MEDICINE CLINIC | Facility: CLINIC | Age: 48
End: 2023-10-20
Payer: COMMERCIAL

## 2023-10-23 ENCOUNTER — PATIENT ROUNDING (BHMG ONLY) (OUTPATIENT)
Dept: CARDIOLOGY | Facility: CLINIC | Age: 48
End: 2023-10-23
Payer: COMMERCIAL

## 2023-10-23 NOTE — PROGRESS NOTES
A My-Chart message was sent to the patient for PATIENT ROUNDING with [facility]

## 2023-10-26 ENCOUNTER — TELEPHONE (OUTPATIENT)
Dept: FAMILY MEDICINE CLINIC | Facility: CLINIC | Age: 48
End: 2023-10-26

## 2023-10-26 NOTE — TELEPHONE ENCOUNTER
Caller: Tali Mcduffie    Relationship: Self    Best call back number: [phone]     Which medication are you concerned about: famotidine (Pepcid) 20 MG tablet     Who prescribed you this medication: LUI CONWAY    When did you start taking this medication: LAST WEEK    What are your concerns: PATIENT WANTS TO KNOW IF SHE CAN TAKE 2 PILLS IN THE MORNING INSTEAD OF 1 IN THE MORNING AND 1 AT NIGHT    PLEASE LEAVE MESSAGE ON [employer]    How long have you had these concerns:

## 2023-10-27 DIAGNOSIS — K21.9 GASTROESOPHAGEAL REFLUX DISEASE WITHOUT ESOPHAGITIS: Primary | ICD-10-CM

## 2023-10-27 RX ORDER — OMEPRAZOLE 20 MG/1
20 CAPSULE, DELAYED RELEASE ORAL DAILY
Qty: 30 CAPSULE | Refills: 3 | Status: SHIPPED | OUTPATIENT
Start: 2023-10-27

## 2023-10-27 NOTE — TELEPHONE ENCOUNTER
Called spoke with patient advise to her what you said,She states she would like to know if there is something stronger then Pepcid for her acid reflux or if there is something she can along with her Pepcid.Patient also states she has a consult with April in December for her colonoscopy she will also mention maybe during a upper GI also.

## 2023-10-27 NOTE — TELEPHONE ENCOUNTER
Patient has been unable to tolerate coming off PPI for GERD. We had tried Pepcid without relief.  Will discontinue Pepcid and send in omeprazole.      This document has been electronically signed by Navneet Moreno MD on October 27, 2023 11:27 EDT

## 2023-10-30 ENCOUNTER — NUTRITION (OUTPATIENT)
Dept: DIABETES SERVICES | Facility: HOSPITAL | Age: 48
End: 2023-10-30
Payer: COMMERCIAL

## 2023-10-30 DIAGNOSIS — R73.03 PRE-DIABETES: Primary | ICD-10-CM

## 2023-10-30 PROCEDURE — 97802 MEDICAL NUTRITION INDIV IN: CPT | Performed by: DIETITIAN, REGISTERED

## 2023-11-14 DIAGNOSIS — E78.2 MIXED HYPERLIPIDEMIA: ICD-10-CM

## 2023-11-14 RX ORDER — ATORVASTATIN CALCIUM 40 MG/1
40 TABLET, FILM COATED ORAL DAILY
Qty: 90 TABLET | Refills: 1 | Status: SHIPPED | OUTPATIENT
Start: 2023-11-14

## 2023-11-14 RX ORDER — METOPROLOL SUCCINATE 25 MG/1
25 TABLET, EXTENDED RELEASE ORAL DAILY
Qty: 30 TABLET | Refills: 2 | Status: SHIPPED | OUTPATIENT
Start: 2023-11-14

## 2023-11-20 ENCOUNTER — OFFICE VISIT (OUTPATIENT)
Dept: FAMILY MEDICINE CLINIC | Facility: CLINIC | Age: 48
End: 2023-11-20
Payer: COMMERCIAL

## 2023-11-20 VITALS
OXYGEN SATURATION: 96 % | BODY MASS INDEX: 29.41 KG/M2 | DIASTOLIC BLOOD PRESSURE: 88 MMHG | WEIGHT: 183 LBS | SYSTOLIC BLOOD PRESSURE: 138 MMHG | HEIGHT: 66 IN | HEART RATE: 70 BPM

## 2023-11-20 DIAGNOSIS — R03.0 ELEVATED BP WITHOUT DIAGNOSIS OF HYPERTENSION: Primary | ICD-10-CM

## 2023-11-20 DIAGNOSIS — G44.229 CHRONIC TENSION-TYPE HEADACHE, NOT INTRACTABLE: ICD-10-CM

## 2023-11-20 NOTE — PROGRESS NOTES
"    Subjective:       Tali Mcduffie is a 48 y.o. female with a concurrent medical history of gastroesophageal reflux disease and hyperlipidemia who presents to follow-up on blood pressure, palpitations, and headaches.       Blood pressure - Blood pressure was 143/96 at last visit and was previously in the 140s systolic as well.  Patient has never been diagnosed with hypertension and told me at our first encounter she believed her blood pressure elevation was related to whitecoat hypertension.  I did start her on metoprolol at last visit for headaches and palpitations and blood pressure today is 138/88.  We will recheck this at next visit but if blood pressure ever goes above 140 systolic again, may need to start ACE inhibitor, angiotensin receptor blocker, calcium channel blocker, or thiazide diuretic.        Palpitations - Performed EKG at last visit that showed left bundle branch.  Because patient was symptomatic, did start metoprolol and send to cardiology and she says she has had to her appointment with them and has a Holter monitor and echocardiogram scheduled for next month.  She does tell me the formulation of metoprolol was switched from Toprol tartrate to metoprolol succinate.  She does say that since this happened, she is still having palpitations but not as many and that they are overall improved.        Headaches -patient has previously been diagnosed with migraines in 2001 and has been treated multiple times for migraines with medication such as Topamax or Imitrex or even \"shots in the back of the neck\" that did not help.  She does not have sensitivity to light or sound or nausea with headaches and I thought perhaps patient's headaches were related to elevation in blood pressure versus perhaps a chronic tension headache that may improve with a tricyclic antidepressant, such as amitriptyline.  However, I did start her on metoprolol given the blood pressure and palpitations and she says overall her " headaches have improved.  Based on this, would not change therapy today but I did tell her if her symptoms become bothersome again, we could revisit this.    Patient does have an epidermal inclusion cyst on her finger that is bothersome.  Is only been there for 2 months.  It had been treated as a wart which did not help if it continues to bother her, we will refer her to dermatology for consideration of potentially intralesional corticosteroids.        The following portions of the patient's history were reviewed and updated as appropriate: allergies, current medications, past family history, past medical history, past social history, past surgical history, and problem list.    Past Medical Hx:  Past Medical History:   Diagnosis Date    Cataract     GERD (gastroesophageal reflux disease)     Glaucoma     History of medical problems     Hypertension     Kidney stone        Past Surgical Hx:  Past Surgical History:   Procedure Laterality Date    APPENDECTOMY      CHOLECYSTECTOMY      TONSILLECTOMY      WISDOM TOOTH EXTRACTION Bilateral        Current Meds:    Current Outpatient Medications:     atorvastatin (Lipitor) 40 MG tablet, Take 1 tablet by mouth Daily., Disp: 90 tablet, Rfl: 1    metoprolol succinate XL (TOPROL-XL) 25 MG 24 hr tablet, Take 1 tablet by mouth Daily., Disp: 30 tablet, Rfl: 2    omeprazole (priLOSEC) 20 MG capsule, Take 1 capsule by mouth Daily., Disp: 30 capsule, Rfl: 3    Allergies:  Allergies   Allergen Reactions    Penicillins Hives and Rash       Family Hx:  Family History   Problem Relation Age of Onset    Diabetes Mother     Heart disease Father     Other Sister         Social History:  Social History     Socioeconomic History    Marital status: Single   Tobacco Use    Smoking status: Never    Smokeless tobacco: Never   Vaping Use    Vaping Use: Never used   Substance and Sexual Activity    Alcohol use: Yes     Comment: social    Drug use: Not Currently    Sexual activity: Defer  "      Review of Systems  Review of Systems   Cardiovascular:  Positive for palpitations (improving).   Neurological:  Positive for headaches (improving).       Objective:     /88   Pulse 70   Ht 167.6 cm (66\")   Wt 83 kg (183 lb)   LMP 11/06/2023 (Approximate)   SpO2 96%   BMI 29.54 kg/m²   Physical Exam  Constitutional:       General: She is not in acute distress.     Appearance: Normal appearance. She is not ill-appearing, toxic-appearing or diaphoretic.   Cardiovascular:      Rate and Rhythm: Normal rate and regular rhythm.   Pulmonary:      Effort: Pulmonary effort is normal. No respiratory distress.   Skin:     Comments: Epidermal inclusion cyst finger   Neurological:      Mental Status: She is alert.          Assessment/Plan:     Diagnoses and all orders for this visit:    1. Elevated BP without diagnosis of hypertension (Primary)    Blood pressure was 143/96 at last visit and was previously in the 140s systolic as well.  Patient has never been diagnosed with hypertension and told me at our first encounter she believed her blood pressure elevation was related to whitecoat hypertension.  I did start her on metoprolol at last visit for headaches and palpitations and blood pressure today is 138/88.  We will recheck this at next visit but if blood pressure ever goes above 140 systolic again, may need to start ACE inhibitor, angiotensin receptor blocker, calcium channel blocker, or thiazide diuretic.        2. Chronic tension-type headache, not intractable  Patient has previously been diagnosed with migraines in 2001 and has been treated multiple times for migraines with medication such as Topamax or Imitrex or even \"shots in the back of the neck\" that did not help.  She does not have sensitivity to light or sound or nausea with headaches and I thought perhaps patient's headaches were related to elevation in blood pressure versus perhaps a chronic tension headache that may improve with a tricyclic " antidepressant, such as amitriptyline.  However, I did start her on metoprolol given the blood pressure and palpitations and she says overall her headaches have improved.  Based on this, would not change therapy today but I did tell her if her symptoms become bothersome again, we could revisit this.            Rx changes: none    Follow-up:     Return in about 6 months (around 5/20/2024) for Blood pressure and headaches .    Preventative:  Health Maintenance   Topic Date Due    COLORECTAL CANCER SCREENING  Never done    ANNUAL PHYSICAL  Never done    PAP SMEAR  Never done    COVID-19 Vaccine (1) 11/21/2023 (Originally 1975)    LIPID PANEL  10/16/2024    BMI FOLLOWUP  10/17/2024    TDAP/TD VACCINES (2 - Td or Tdap) 05/09/2026    HEPATITIS C SCREENING  Completed    INFLUENZA VACCINE  Completed    Pneumococcal Vaccine 0-64  Aged Out         This document has been electronically signed by Navneet Moreno MD on November 20, 2023 15:44 EST       Parts of this note are electronic transcriptions/translations of spoken language to printed text using the Dragon Dictation system.

## 2023-11-22 ENCOUNTER — TELEPHONE (OUTPATIENT)
Dept: CARDIOLOGY | Facility: CLINIC | Age: 48
End: 2023-11-22
Payer: COMMERCIAL

## 2023-11-22 NOTE — TELEPHONE ENCOUNTER
----- Message from ADITYA Bianchi sent at 11/22/2023  8:43 AM EST -----  Notify pt echo result:  Normal left-ventricular systolic function. EF 59%  No significant valve abnormalities noted.  Follow up as scheduled

## 2023-11-28 ENCOUNTER — OFFICE VISIT (OUTPATIENT)
Dept: FAMILY MEDICINE CLINIC | Facility: CLINIC | Age: 48
End: 2023-11-28
Payer: COMMERCIAL

## 2023-11-28 VITALS
HEIGHT: 66 IN | DIASTOLIC BLOOD PRESSURE: 88 MMHG | BODY MASS INDEX: 29.41 KG/M2 | HEART RATE: 88 BPM | SYSTOLIC BLOOD PRESSURE: 133 MMHG | OXYGEN SATURATION: 99 % | WEIGHT: 183 LBS

## 2023-11-28 DIAGNOSIS — R31.9 HEMATURIA, UNSPECIFIED TYPE: ICD-10-CM

## 2023-11-28 DIAGNOSIS — Z01.419 WELL WOMAN EXAM WITH ROUTINE GYNECOLOGICAL EXAM: Primary | ICD-10-CM

## 2023-11-28 DIAGNOSIS — E66.3 OVERWEIGHT WITH BODY MASS INDEX (BMI) OF 29 TO 29.9 IN ADULT: ICD-10-CM

## 2023-11-28 LAB
BILIRUB BLD-MCNC: NEGATIVE MG/DL
CLARITY, POC: CLEAR
COLOR UR: YELLOW
EXPIRATION DATE: ABNORMAL
GLUCOSE UR STRIP-MCNC: NEGATIVE MG/DL
KETONES UR QL: NEGATIVE
LEUKOCYTE EST, POC: NEGATIVE
Lab: ABNORMAL
NITRITE UR-MCNC: NEGATIVE MG/ML
PH UR: 5.5 [PH] (ref 5–8)
PROT UR STRIP-MCNC: NEGATIVE MG/DL
RBC # UR STRIP: ABNORMAL /UL
SP GR UR: 1.03 (ref 1–1.03)
UROBILINOGEN UR QL: NORMAL

## 2023-11-28 PROCEDURE — G0123 SCREEN CERV/VAG THIN LAYER: HCPCS

## 2023-11-28 PROCEDURE — 81003 URINALYSIS AUTO W/O SCOPE: CPT

## 2023-11-28 PROCEDURE — 99396 PREV VISIT EST AGE 40-64: CPT

## 2023-11-28 NOTE — PROGRESS NOTES
"Subjective   History of Present Illness    Tali Mcduffie is a 48 y.o. female who presents for annual  physical exam/well woman with PAP. She is a patient of my colleague Dr. Navneet Moreno. She reports she still has periods/ LMP was beginning of November. Reports her periods last 2-3 days with one heavy days followed by 2 lighter days. She denies any excessive heavy or painful periods. Reports she has had one vaginal delivery. She has a history of abnormal PAP in  where pre-cancerous cells were noted. They were removed and she has not had an abnormal PAP since then. Last PAP was 2018.     She has a mammogram and colonoscopy ordered by her PCP. Last mammogram was . She denies performing monthly breast exams.     She also reports concerns about weight. She repots she has been trying to cut back on carbs and sugars as her labs showed elevated glucose.     No other questions or concerns today.   Obstetric History:  OB History          1    Para   1    Term                AB        Living             SAB        IAB        Ectopic        Molar        Multiple        Live Births                   Menstrual History:     Patient's last menstrual period was 2023 (approximate).  Period Duration (Days): 3  Period Pattern: Regular  Menstrual Flow: Moderate  Dysmenorrhea: None  Cyclic Symptoms: None    Sexual History:         No results found for: \"HPVAPTIMA\"    Current contraception: abstinence  History of abnormal Pap smear: yes -   MATEUS exposure in utero: no  Received Gardasil immunization: no  Perform regular self breast exam: no  Family history of uterine or ovarian cancer: no  Family History of cervical cancer: no  Family History of colon cancer/colon polyps: yes - mother-polyps  Regular self breast exam: no  History of abnormal mammogram: no  Family history of breast cancer: no  History of abnormal lipids: yes - current     The following portions of the patient's history were reviewed and " updated as appropriate: allergies, current medications, past family history, past medical history, past social history, past surgical history, and problem list.    Review of Systems   Constitutional: Negative.    HENT: Negative.     Eyes: Negative.    Respiratory: Negative.     Cardiovascular: Negative.    Gastrointestinal: Negative.    Endocrine: Negative.    Genitourinary: Negative.    Musculoskeletal: Negative.    Skin: Negative.    Allergic/Immunologic: Negative.    Neurological: Negative.    Hematological: Negative.    Psychiatric/Behavioral: Negative.         Pertinent items are noted in HPI.     Objective   Physical Exam  Vitals reviewed. Exam conducted with a chaperone present.   Constitutional:       General: She is awake. She is not in acute distress.     Appearance: She is overweight. She is not ill-appearing.   HENT:      Head: Normocephalic.   Eyes:      Conjunctiva/sclera: Conjunctivae normal.   Cardiovascular:      Rate and Rhythm: Normal rate and regular rhythm.      Heart sounds: Normal heart sounds, S1 normal and S2 normal.   Pulmonary:      Effort: Pulmonary effort is normal.      Breath sounds: Normal breath sounds.   Chest:   Breasts:     Right: Normal. No swelling, mass, nipple discharge, skin change or tenderness.      Left: Normal. No swelling, mass, nipple discharge, skin change or tenderness.   Abdominal:      General: Abdomen is flat. Bowel sounds are normal.      Palpations: Abdomen is soft.      Tenderness: There is no abdominal tenderness.   Genitourinary:     General: Normal vulva.      Pubic Area: No rash.       Labia:         Right: No rash, tenderness or lesion.         Left: No rash, tenderness or lesion.       Urethra: No urethral pain, urethral swelling or urethral lesion.      Vagina: Normal. No vaginal discharge.      Cervix: Normal.      Uterus: Normal.       Adnexa: Right adnexa normal and left adnexa normal.      Rectum: Normal.   Musculoskeletal:         General: Normal  "range of motion.      Cervical back: Normal range of motion.      Right lower leg: No edema.      Left lower leg: No edema.   Skin:     General: Skin is warm.      Findings: No lesion or rash.   Neurological:      General: No focal deficit present.      Mental Status: She is alert and oriented to person, place, and time. Mental status is at baseline.      Motor: Motor function is intact.      Coordination: Coordination is intact.      Gait: Gait is intact.   Psychiatric:         Attention and Perception: Attention and perception normal.         Mood and Affect: Mood and affect normal.         Speech: Speech normal.         Behavior: Behavior normal. Behavior is cooperative.         Thought Content: Thought content normal.         Cognition and Memory: Cognition and memory normal.         Judgment: Judgment normal.         /88 (BP Location: Left arm, Patient Position: Sitting, Cuff Size: Large Adult)   Pulse 88   Ht 167.6 cm (66\")   Wt 83 kg (183 lb)   LMP 11/06/2023 (Approximate)   SpO2 99%   BMI 29.54 kg/m²   Wt Readings from Last 3 Encounters:   11/28/23 83 kg (183 lb)   11/22/23 83 kg (183 lb)   11/20/23 83 kg (183 lb)      BP Readings from Last 3 Encounters:   11/28/23 133/88   11/22/23 138/88   11/20/23 138/88        General:   alert, appears stated age, and cooperative   Heart: regular rate and rhythm, S1, S2 normal, no murmur, click, rub or gallop   Lungs: clear to auscultation bilaterally   Abdomen: soft, non-tender, without masses or organomegaly   Breast: inspection negative, no nipple discharge or bleeding, no masses or nodularity palpable   Vulva: normal   Vagina: normal mucosa   Cervix: anteverted   Uterus: normal size   Adnexa: normal adnexa and no mass, fullness, tenderness     Assessment & Plan   Diagnoses and all orders for this visit:    1. Well woman exam with routine gynecological exam (Primary)  Comments:  tolerated well, discussed preventative tasks such as daily exercise, healthy " diet, get adeqaute sleep  Orders:  -     POCT urinalysis dipstick, automated  -     IGP,rfx Aptima HPV All Pth; Future  -     IGP,rfx Aptima HPV All Pth    2. Overweight with body mass index (BMI) of 29 to 29.9 in adult  Comments:  small frequent meals, calorie deficit, limit carbs and sugars, increase exercise        Await pap smear results.  Breast self exam technique reviewed and patient encouraged to perform self-exam monthly.  Discussed healthy lifestyle modifications.  Urinalysis.- moderate blood noted, add on urine microscopic. Patient was unable to void prior to PAP.

## 2023-11-29 ENCOUNTER — TELEPHONE (OUTPATIENT)
Dept: FAMILY MEDICINE CLINIC | Facility: CLINIC | Age: 48
End: 2023-11-29

## 2023-11-29 NOTE — TELEPHONE ENCOUNTER
Caller: Tali Mcduffie    Relationship: Self    Best call back number: [phone]    What is the medical concern/diagnosis: CYSTS ON FINGERS     What specialty or service is being requested: DERMATOLOGY     What is the office location: WITHIN [facility]     Any additional details: PATIENT IS REQUESTING REFERRAL TO DERMATOLOGY FOR THE CYSTS ON FINGERS. SHE DISCUSSED THIS ISSUE WITH MAN DURING HER VISIT WITH HIM. SHE MENTIONED THAT THE PLAN WAS TO WAIT UNTIL AFTER LUIZ TO SEE HOW IT WAS THEN, BUT SHE IS HAVING ISSUES WITH WEARING GLOVES FOR WORK DUE TO IT CAUSING IRRITATION TO THE CYSTS AND PAIN.

## 2023-11-30 DIAGNOSIS — L72.0 INCLUSION CYST: Primary | ICD-10-CM

## 2023-11-30 LAB
CONV .: NORMAL
CYTOLOGIST CVX/VAG CYTO: NORMAL
CYTOLOGY CVX/VAG DOC CYTO: NORMAL
CYTOLOGY CVX/VAG DOC THIN PREP: NORMAL
DX ICD CODE: NORMAL
HIV 1 & 2 AB SER-IMP: NORMAL
OTHER STN SPEC: NORMAL
STAT OF ADQ CVX/VAG CYTO-IMP: NORMAL

## 2023-11-30 NOTE — TELEPHONE ENCOUNTER
Ms. Mcduffie has a painful inclusion cyst on her finger. We previously recommended conservative management due to the likelihood of resolution. However, this is causing her issues with her work, which requires use of her hands, and she requests referral to dermatology to discuss other management options. I have placed this referral at her request.       This document has been electronically signed by Navneet Moreno MD on November 30, 2023 07:34 EST

## 2023-12-11 ENCOUNTER — OFFICE VISIT (OUTPATIENT)
Dept: SURGERY | Facility: CLINIC | Age: 48
End: 2023-12-11
Payer: COMMERCIAL

## 2023-12-11 ENCOUNTER — PREP FOR SURGERY (OUTPATIENT)
Dept: OTHER | Facility: HOSPITAL | Age: 48
End: 2023-12-11
Payer: COMMERCIAL

## 2023-12-11 VITALS
SYSTOLIC BLOOD PRESSURE: 151 MMHG | WEIGHT: 183 LBS | HEART RATE: 79 BPM | HEIGHT: 66 IN | DIASTOLIC BLOOD PRESSURE: 94 MMHG | BODY MASS INDEX: 29.41 KG/M2

## 2023-12-11 DIAGNOSIS — Z12.11 SCREENING FOR MALIGNANT NEOPLASM OF COLON: Primary | ICD-10-CM

## 2023-12-11 PROCEDURE — S0285 CNSLT BEFORE SCREEN COLONOSC: HCPCS | Performed by: NURSE PRACTITIONER

## 2023-12-11 RX ORDER — SODIUM CHLORIDE 0.9 % (FLUSH) 0.9 %
10 SYRINGE (ML) INJECTION AS NEEDED
OUTPATIENT
Start: 2023-12-11

## 2023-12-11 RX ORDER — SODIUM CHLORIDE 0.9 % (FLUSH) 0.9 %
3 SYRINGE (ML) INJECTION EVERY 12 HOURS SCHEDULED
OUTPATIENT
Start: 2023-12-11

## 2023-12-11 RX ORDER — SODIUM CHLORIDE 9 MG/ML
40 INJECTION, SOLUTION INTRAVENOUS AS NEEDED
OUTPATIENT
Start: 2023-12-11

## 2023-12-11 NOTE — PROGRESS NOTES
Chief Complaint: Colonoscopy (consult)    Subjective      Colonoscopy consultation       History of Present Illness  Tali Mcduffie is a 48 y.o. female presents to [facility] for colonoscopy consultation.    Patient presents today on referral from Dr. Navneet Moreno for colonoscopy consultation.  Patient denies any abdominal pain, change in bowel habit, or rectal bleeding.  Denies any family history of colorectal cancer.  Reports her mother always has polyps removed.  No previous colonoscopy.    Patient denies LAKISHA.  Denies any cardiac issues.  Denies taking a GLP-1 receptors.    Objective     Past Medical History:   Diagnosis Date    Cataract     Cholelithiasis 2000    GERD (gastroesophageal reflux disease)     Glaucoma     Headache     History of medical problems     Hyperlipidemia     Hypertension     Kidney stone        Past Surgical History:   Procedure Laterality Date    APPENDECTOMY      CHOLECYSTECTOMY      TONSILLECTOMY      WISDOM TOOTH EXTRACTION Bilateral        Outpatient Medications Marked as Taking for the 12/11/23 encounter (Office Visit) with Trevor April, APRN   Medication Sig Dispense Refill    atorvastatin (Lipitor) 40 MG tablet Take 1 tablet by mouth Daily. 90 tablet 1    metoprolol succinate XL (TOPROL-XL) 25 MG 24 hr tablet Take 1 tablet by mouth Daily. 30 tablet 2    omeprazole (priLOSEC) 20 MG capsule Take 1 capsule by mouth Daily. 30 capsule 3       Allergies   Allergen Reactions    Penicillins Hives and Rash        Family History   Problem Relation Age of Onset    Diabetes Mother     Heart disease Father     Other Sister        Social History     Socioeconomic History    Marital status: Single   Tobacco Use    Smoking status: Never    Smokeless tobacco: Never   Vaping Use    Vaping Use: Never used   Substance and Sexual Activity    Alcohol use: Not Currently     Comment: social    Drug use: Never    Sexual activity: Not Currently     Partners: Male      "Birth control/protection: None       Review of Systems   Constitutional:  Negative for chills and fever.   Gastrointestinal:  Negative for abdominal distention, abdominal pain, anal bleeding, blood in stool, constipation, diarrhea and rectal pain.        Vital Signs:   /94   Pulse 79   Ht 167.6 cm (66\")   Wt 83 kg (183 lb)   BMI 29.54 kg/m²      Physical Exam  Vitals and nursing note reviewed.   Constitutional:       General: She is not in acute distress.     Appearance: Normal appearance.   HENT:      Head: Normocephalic.   Cardiovascular:      Rate and Rhythm: Normal rate.   Pulmonary:      Effort: Pulmonary effort is normal.      Breath sounds: No stridor.   Abdominal:      Palpations: Abdomen is soft.      Tenderness: There is no guarding.   Musculoskeletal:         General: No deformity. Normal range of motion.   Skin:     General: Skin is warm and dry.      Coloration: Skin is not jaundiced.   Neurological:      General: No focal deficit present.      Mental Status: She is alert and oriented to person, place, and time.   Psychiatric:         Mood and Affect: Mood normal.         Thought Content: Thought content normal.          Result Review :          []  Laboratory  []  Radiology  []  Pathology  []  Microbiology  []  EKG/Telemetry   []  Cardiology/Vascular   []  Old records  I spent 15 minutes caring for Tali on this date of service. This time includes time spent by me in the following activities: reviewing tests, obtaining and/or reviewing a separately obtained history, performing a medically appropriate examination and/or evaluation, ordering medications, tests, or procedures, and documenting information in the medical record.     Assessment and Plan    Diagnoses and all orders for this visit:    1. Screening for malignant neoplasm of colon (Primary)    Sutab sample    Follow Up   Return for Scheduled colonoscopy with Dr. Lyn on 3/6/2024 [facility].    Hospital arrival " time:12:30.      Possible risks/complications, benefits, and alternatives to surgical or invasive procedures have been explained to patient and/or legal guardian.    Patient has been evaluated and can tolerate anesthesia and/or sedation. Risks, benefits, and alternatives to anesthesia and sedation have been explained to the patient and/or legal guardian. Patient verbalizes understanding and is willing to proceed with the above plan.     Patient was given instructions and counseling regarding her condition or for health maintenance advice. Please see specific information pulled into the AVS if appropriate.

## 2023-12-18 ENCOUNTER — HOSPITAL ENCOUNTER (OUTPATIENT)
Dept: CARDIOLOGY | Facility: HOSPITAL | Age: 48
Discharge: HOME | End: 2023-12-18
Admitting: STUDENT IN AN ORGANIZED HEALTH CARE EDUCATION/TRAINING PROGRAM
Payer: COMMERCIAL

## 2023-12-18 DIAGNOSIS — E78.2 MIXED HYPERLIPIDEMIA: ICD-10-CM

## 2023-12-18 DIAGNOSIS — R00.2 PALPITATIONS: ICD-10-CM

## 2023-12-18 PROCEDURE — 93225 XTRNL ECG REC<48 HRS REC: CPT

## 2023-12-18 RX ORDER — METOPROLOL SUCCINATE 25 MG/1
25 TABLET, EXTENDED RELEASE ORAL DAILY
Qty: 30 TABLET | Refills: 2 | OUTPATIENT
Start: 2023-12-18

## 2023-12-18 RX ORDER — ATORVASTATIN CALCIUM 40 MG/1
40 TABLET, FILM COATED ORAL DAILY
Qty: 90 TABLET | Refills: 1 | Status: SHIPPED | OUTPATIENT
Start: 2023-12-18

## 2023-12-22 VITALS — BODY MASS INDEX: 29.78 KG/M2 | HEIGHT: 66 IN | WEIGHT: 185.3 LBS

## 2023-12-22 NOTE — PROGRESS NOTES
"  Tali Mcduffie is a 48 y.o. female who presents to [facility] for nutrition consult r/t diagnosis of prediabetes.  Tali Mcduffie is referred by Dr. Moreno.    Past Medical History:   Diagnosis Date    Cataract     Cholelithiasis 2000    GERD (gastroesophageal reflux disease)     Glaucoma     Headache     History of medical problems     Hyperlipidemia     Hypertension     Kidney stone        Anthropometrics    167.6 cm (66\")  84.1 kg (185 lb 4.8 oz)  29.91 kg/m²    Diabetes History    Diabetes History  What type of diabetes do you have?: Pre-diabetes  Current DM knowledge: good  Have you had diabetes education/teaching in the past?: no  Do you test your blood sugar at home?: no    Education Preferences    Education Preferences  What areas of diabetes would you like to learn about?: diet information    Nutrition Information    Nutrition Information  Enter everything you can remember eating in the last 24 hours (1 day): breakfast- smoothie w/ oats, 1% milk, lowfat yogurt, frozen fruit; lunch- wrap or sandwich; dinner- frozen pasta w/ chicken and vegetables; snacks- chips, chocolate; beverages- water, coffee, less tea  What is the biggest challenge you have with your diet?: Knowledge    Education Needs    DM Education Needs  Healthy Eating: RD consult, Reviewed meal plan, Basic meal plan provided  Motivation: Engaged  Teaching Method: Explanation, Discussion, Handouts  Patient Response: Verbalized understanding    DM Goals    DM Goals  Healthy Eating - Goal: Today  Being Active - Goal: Today      Medications    Current Outpatient Medications   Medication    atorvastatin    metoprolol succinate XL    omeprazole     Labs       Lab Results   Component Value Date    CHOL 300 (H) 10/16/2023    TRIG 183 (H) 10/16/2023    HDL 58 10/16/2023     (H) 10/16/2023     October 2023 A1c 5.9%    Nutrition counseling provided on carbohydrate counting, portion control, measuring and " reading labels.  Discussed eating out and gave suggestions on controlling carbohydrate intake and making healthier food choices.     Meal Plan:     Total Carbohydrates per meal: 2-3 carb servings/meal, at least 3 meals/day  Lean protein with meals.  Limit added fats.  Snacks: 1 carbohydrate serving (</= 22 g) + 1 protein serving.     Daily exercise encouraged (as recommended by healthcare provider). Discussed the benefits of exercise in lowering blood glucose, blood pressure, cholesterol, stress and controlling body weight.     Literature provided: Diabetes Nutrition Placemat, Choose Your Foods Booklet    Dietitian contact number provided.  Patient encouraged to call with questions or concerns.     Time spent with patient: 45 minutes    Mercedes Rico RDN, LD  10/30/2023

## 2023-12-26 ENCOUNTER — TELEPHONE (OUTPATIENT)
Dept: FAMILY MEDICINE CLINIC | Facility: CLINIC | Age: 48
End: 2023-12-26
Payer: COMMERCIAL

## 2023-12-26 DIAGNOSIS — E78.2 MIXED HYPERLIPIDEMIA: ICD-10-CM

## 2023-12-26 RX ORDER — ATORVASTATIN CALCIUM 40 MG/1
40 TABLET, FILM COATED ORAL DAILY
Qty: 90 TABLET | Refills: 1 | Status: SHIPPED | OUTPATIENT
Start: 2023-12-26

## 2023-12-26 RX ORDER — METOPROLOL SUCCINATE 25 MG/1
25 TABLET, EXTENDED RELEASE ORAL DAILY
Qty: 30 TABLET | Refills: 2 | Status: SHIPPED | OUTPATIENT
Start: 2023-12-26 | End: 2023-12-27 | Stop reason: SDUPTHER

## 2023-12-27 RX ORDER — METOPROLOL SUCCINATE 25 MG/1
25 TABLET, EXTENDED RELEASE ORAL DAILY
Qty: 30 TABLET | Refills: 11 | Status: SHIPPED | OUTPATIENT
Start: 2023-12-27

## 2023-12-27 NOTE — TELEPHONE ENCOUNTER
Patient called in needing refill and for us to change her pharmacy to [facility].

## 2024-01-16 ENCOUNTER — TELEPHONE (OUTPATIENT)
Dept: SURGERY | Facility: CLINIC | Age: 49
End: 2024-01-16
Payer: COMMERCIAL

## 2024-01-16 NOTE — TELEPHONE ENCOUNTER
Procedure: Colonoscopy and/or EGD     Med Directive: NA     PMH: palpitations, HLD     Last Seen:  10/18/23

## 2024-01-16 NOTE — TELEPHONE ENCOUNTER
[facility] [place]  [facility]  [address]  [place] [address]  Fax [phone]  Phone [phone]     To whom it may concern:    I am writing on behalf of our mutual patient Tali Mcduffie 1975     Tali Mcduffie  is scheduled to have a colonoscopy by Vinny Lyn  which will be performed at [facility] on 3/6/24. Please respond to this request noting your recommendations regarding clearance from the cardiology standpoint. You may contact our office at [phone] with any questions. I appreciate your prompt response to this matter. Please return this form to our office as soon as possible to [phone]. Tali Mcduffie is scheduled for this procedure pending your approval. Thank you for your time and assistance.     [] I approve my patient, Tali Mcduffie , to proceed with the surgical procedure listed above.   [] I do NOT approve my patient, Tali Mcduffie , to proceed with the surgical procedure listed above.   [] I approve my patient, Tali Mcduffie , from a cardiology standpoint.   [] I do NOT approve my patient, Tali Mcduffie , from a cardiology standpoint at this time.       Approving physician name(please print): ________________________________________    Approving physician signature: _____________________________________________     Date: ____________________________    Sincerely,     Laury BURGESS

## 2024-02-05 ENCOUNTER — LAB (OUTPATIENT)
Dept: LAB | Facility: HOSPITAL | Age: 49
End: 2024-02-05
Payer: COMMERCIAL

## 2024-02-05 DIAGNOSIS — D64.9 NORMOCYTIC ANEMIA: ICD-10-CM

## 2024-02-05 LAB
BACTERIA UR QL AUTO: ABNORMAL /HPF
BASOPHILS # BLD AUTO: 0.03 10*3/MM3 (ref 0–0.2)
BASOPHILS NFR BLD AUTO: 0.4 % (ref 0–1.5)
BILIRUB UR QL STRIP: NEGATIVE
CLARITY UR: ABNORMAL
COLOR UR: YELLOW
DEPRECATED RDW RBC AUTO: 45 FL (ref 37–54)
EOSINOPHIL # BLD AUTO: 0.11 10*3/MM3 (ref 0–0.4)
EOSINOPHIL NFR BLD AUTO: 1.6 % (ref 0.3–6.2)
ERYTHROCYTE [DISTWIDTH] IN BLOOD BY AUTOMATED COUNT: 15 % (ref 12.3–15.4)
GLUCOSE UR STRIP-MCNC: NEGATIVE MG/DL
HCT VFR BLD AUTO: 35.8 % (ref 34–46.6)
HGB BLD-MCNC: 11.6 G/DL (ref 12–15.9)
HGB UR QL STRIP.AUTO: ABNORMAL
HYALINE CASTS UR QL AUTO: ABNORMAL /LPF
IMM GRANULOCYTES # BLD AUTO: 0.03 10*3/MM3 (ref 0–0.05)
IMM GRANULOCYTES NFR BLD AUTO: 0.4 % (ref 0–0.5)
KETONES UR QL STRIP: ABNORMAL
LEUKOCYTE ESTERASE UR QL STRIP.AUTO: NEGATIVE
LYMPHOCYTES # BLD AUTO: 1.81 10*3/MM3 (ref 0.7–3.1)
LYMPHOCYTES NFR BLD AUTO: 26.2 % (ref 19.6–45.3)
MCH RBC QN AUTO: 26.5 PG (ref 26.6–33)
MCHC RBC AUTO-ENTMCNC: 32.4 G/DL (ref 31.5–35.7)
MCV RBC AUTO: 81.7 FL (ref 79–97)
MONOCYTES # BLD AUTO: 0.52 10*3/MM3 (ref 0.1–0.9)
MONOCYTES NFR BLD AUTO: 7.5 % (ref 5–12)
NEUTROPHILS NFR BLD AUTO: 4.4 10*3/MM3 (ref 1.7–7)
NEUTROPHILS NFR BLD AUTO: 63.9 % (ref 42.7–76)
NITRITE UR QL STRIP: NEGATIVE
NRBC BLD AUTO-RTO: 0 /100 WBC (ref 0–0.2)
PH UR STRIP.AUTO: 5.5 [PH] (ref 5–8)
PLATELET # BLD AUTO: 303 10*3/MM3 (ref 140–450)
PMV BLD AUTO: 9.8 FL (ref 6–12)
PROT UR QL STRIP: NEGATIVE
RBC # BLD AUTO: 4.38 10*6/MM3 (ref 3.77–5.28)
RBC # UR STRIP: ABNORMAL /HPF
REF LAB TEST METHOD: ABNORMAL
SP GR UR STRIP: >=1.03 (ref 1–1.03)
SQUAMOUS #/AREA URNS HPF: ABNORMAL /HPF
UROBILINOGEN UR QL STRIP: ABNORMAL
WBC # UR STRIP: ABNORMAL /HPF
WBC NRBC COR # BLD AUTO: 6.9 10*3/MM3 (ref 3.4–10.8)

## 2024-02-05 PROCEDURE — 87086 URINE CULTURE/COLONY COUNT: CPT

## 2024-02-05 PROCEDURE — 81001 URINALYSIS AUTO W/SCOPE: CPT

## 2024-02-05 PROCEDURE — 85025 COMPLETE CBC W/AUTO DIFF WBC: CPT

## 2024-02-05 PROCEDURE — 36415 COLL VENOUS BLD VENIPUNCTURE: CPT

## 2024-02-06 ENCOUNTER — TELEPHONE (OUTPATIENT)
Dept: FAMILY MEDICINE CLINIC | Facility: CLINIC | Age: 49
End: 2024-02-06
Payer: COMMERCIAL

## 2024-02-06 DIAGNOSIS — R73.03 PREDIABETES: Primary | ICD-10-CM

## 2024-02-06 DIAGNOSIS — D64.9 NORMOCYTIC ANEMIA: Primary | ICD-10-CM

## 2024-02-06 DIAGNOSIS — R82.90 ABNORMAL URINALYSIS: Primary | ICD-10-CM

## 2024-02-06 NOTE — TELEPHONE ENCOUNTER
Please let Ms. Orta know that many patients are on both metformin and Lipitor together.  Please let her know that we want to wait and make sure she tolerated Lipitor before starting metformin because both medicines can have side effects individually and if we have started both medications at the same time we would not know which medication would be causing any theoretical side effects.  She does not need to come off Lipitor.  If she would like her iron to be checked, I would be happy to place an order to look at her iron, vitamin B12, and folate levels as well.  Can we call her and ask her these things?    Thank you,    Navneet Moreno      This document has been electronically signed by Navneet Moreno MD on February 6, 2024 14:10 EST

## 2024-02-06 NOTE — PROGRESS NOTES
Can we let Ms. Orta know that her CBC continues to show some very slight anemia?  Normal hemoglobin is 12 and above and her hemoglobin is 11.6.  If she would like, we could check her for iron deficiency although if she wants to wait I think that is reasonable because she has an upcoming appointment for colonoscopy I believe.  Her urinalysis does show trace blood and has shown blood in the past as well.  Could we ask her if she has been on a menstrual cycle that might explain the presence of trace blood?    Thank you,    Navneet Moreno    ?  This document has been electronically signed by Navneet Moreno MD on February 6, 2024 08:43 EST

## 2024-02-06 NOTE — TELEPHONE ENCOUNTER
PT WOULD LIKE TO SEE ABOUT STARTING METFORMIN PT STATES SHE HAS HAD SOME LEG CRAMPING FROM THE STATIN BUT NOT TO SEVERE.  PLEASE ADVISE?

## 2024-02-06 NOTE — TELEPHONE ENCOUNTER
Thank you for letting me know,    I have started metformin at the low-dose 500 mg twice daily.  Common side effects include diarrhea that is usually self-limited.  Can we call her and let her know?    After completion of her colonoscopy, we can discuss whether or not she wants to consider urology referral for cystoscopy for persistent hematuria.      This document has been electronically signed by Navneet Moreno MD on February 6, 2024 13:05 EST

## 2024-02-06 NOTE — TELEPHONE ENCOUNTER
Orders have been placed.      This document has been electronically signed by Navneet Moreno MD on February 6, 2024 16:23 EST

## 2024-02-06 NOTE — TELEPHONE ENCOUNTER
Please advise.     Pt called and reports she was told on her first visit that she couldn't take metformin and Lipitor together. She's asking if she needs to come off the Lipitor.     Pt has concerns in regards to her iron being low asking if she needs to be put on a supplement since she feels tired. Pt asking what are the next steps to help with this.

## 2024-02-07 ENCOUNTER — TELEPHONE (OUTPATIENT)
Dept: FAMILY MEDICINE CLINIC | Facility: CLINIC | Age: 49
End: 2024-02-07
Payer: COMMERCIAL

## 2024-02-07 LAB — BACTERIA SPEC AEROBE CULT: NO GROWTH

## 2024-02-07 NOTE — TELEPHONE ENCOUNTER
Caller: Tali Mcduffie    Relationship: Self    Best call back number: [phone]    What was the call regarding: PATIENT IS RETURNING CALL. SHE IS NOT SURE WHAT THE CALL IS REGARDING. SHE DID MENTION THAT IT COULD BE REGARDING THE MEDICATIONS THAT MAN HAD DISCUSSED PATIENT TAKE, OR IT COULD BE URINE RESULTS.

## 2024-02-19 ENCOUNTER — NUTRITION (OUTPATIENT)
Dept: DIABETES SERVICES | Facility: HOSPITAL | Age: 49
End: 2024-02-19
Payer: COMMERCIAL

## 2024-02-19 DIAGNOSIS — R73.03 PRE-DIABETES: Primary | ICD-10-CM

## 2024-02-19 PROCEDURE — 97803 MED NUTRITION INDIV SUBSEQ: CPT | Performed by: DIETITIAN, REGISTERED

## 2024-02-26 ENCOUNTER — LAB (OUTPATIENT)
Dept: LAB | Facility: HOSPITAL | Age: 49
End: 2024-02-26
Payer: COMMERCIAL

## 2024-02-26 DIAGNOSIS — D64.9 NORMOCYTIC ANEMIA: ICD-10-CM

## 2024-02-26 LAB
FERRITIN SERPL-MCNC: 9.71 NG/ML (ref 13–150)
IRON 24H UR-MRATE: 30 MCG/DL (ref 37–145)
IRON SATN MFR SERPL: 6 % (ref 20–50)
TIBC SERPL-MCNC: 493 MCG/DL (ref 298–536)
TRANSFERRIN SERPL-MCNC: 331 MG/DL (ref 200–360)

## 2024-02-26 PROCEDURE — 82728 ASSAY OF FERRITIN: CPT

## 2024-02-26 PROCEDURE — 84466 ASSAY OF TRANSFERRIN: CPT

## 2024-02-26 PROCEDURE — 83540 ASSAY OF IRON: CPT

## 2024-02-26 PROCEDURE — 36415 COLL VENOUS BLD VENIPUNCTURE: CPT

## 2024-02-27 DIAGNOSIS — D50.9 IRON DEFICIENCY ANEMIA, UNSPECIFIED IRON DEFICIENCY ANEMIA TYPE: Primary | ICD-10-CM

## 2024-02-27 RX ORDER — FERROUS SULFATE 325(65) MG
325 TABLET ORAL
Qty: 90 TABLET | Refills: 1 | Status: SHIPPED | OUTPATIENT
Start: 2024-02-27

## 2024-02-27 NOTE — PROGRESS NOTES
Ms. Orta has decreased iron levels and decreased ferritin, consistent with iron deficiency.  I have sent in an iron pill to the pharmacy.  I see that she has an upcoming colonoscopy and I would highly encourage her to keep that.  If colonoscopy is negative, given persistent trace of blood in the urine, may consider possibility to refer to urology for cystoscopy but we would wait for colonoscopy results first.  Can we call her and make her aware?    Thank you,    Navneet Moreno    ?  This document has been electronically signed by Navneet Moreno MD on February 27, 2024 08:07 EST

## 2024-03-11 ENCOUNTER — OFFICE VISIT (OUTPATIENT)
Dept: CARDIOLOGY | Facility: CLINIC | Age: 49
End: 2024-03-11
Payer: COMMERCIAL

## 2024-03-11 VITALS
HEIGHT: 66 IN | DIASTOLIC BLOOD PRESSURE: 78 MMHG | HEART RATE: 74 BPM | BODY MASS INDEX: 29.41 KG/M2 | WEIGHT: 183 LBS | SYSTOLIC BLOOD PRESSURE: 124 MMHG

## 2024-03-11 DIAGNOSIS — I47.29 NONSUSTAINED VENTRICULAR TACHYCARDIA: ICD-10-CM

## 2024-03-11 DIAGNOSIS — I49.1 PREMATURE ATRIAL COMPLEXES: Primary | ICD-10-CM

## 2024-03-11 PROBLEM — E78.5 HYPERLIPIDEMIA LDL GOAL <100: Status: ACTIVE | Noted: 2024-03-11

## 2024-03-11 PROCEDURE — 99213 OFFICE O/P EST LOW 20 MIN: CPT | Performed by: NURSE PRACTITIONER

## 2024-03-11 NOTE — PROGRESS NOTES
Chief Complaint  Follow-up    Subjective            History of Present Illness  Tali Mcduffie is a 48-year-old female patient who presents to the office today for follow-up.  She was seen by Dr. Preciado on 10/18/2023 for complaint of palpitations.  At that time an echocardiogram was ordered and metoprolol 25 mg daily was initiated.  Her echocardiogram showed normal heart function with no significant valvular abnormalities.  Her PCP ordered a Holter monitor which revealed some PACs and nonsustained ventricular tachycardia episodes.  Her average heart rate was 77 bpm.  Today she admits that she is not having as many palpitations with taking metoprolol.  She denies any new or worsening cardiac symptoms at this time.    PMH  Past Medical History:   Diagnosis Date    Cataract     Cholelithiasis 2000    GERD (gastroesophageal reflux disease)     Glaucoma     Hyperlipidemia     Kidney stone     Premature atrial complexes 10/16/2023         ALLERGY  Allergies   Allergen Reactions    Penicillins Hives and Rash          SURGICALHX  Past Surgical History:   Procedure Laterality Date    APPENDECTOMY      CHOLECYSTECTOMY      TONSILLECTOMY      WISDOM TOOTH EXTRACTION Bilateral           SOC  Social History     Socioeconomic History    Marital status: Single   Tobacco Use    Smoking status: Never    Smokeless tobacco: Never   Vaping Use    Vaping status: Never Used   Substance and Sexual Activity    Alcohol use: Not Currently     Comment: social    Drug use: Never    Sexual activity: Not Currently     Partners: Male     Birth control/protection: None         FAMHX  Family History   Problem Relation Age of Onset    Diabetes Mother     Heart disease Father     Other Sister           MEDSIGONLY  Current Outpatient Medications on File Prior to Visit   Medication Sig    atorvastatin (Lipitor) 40 MG tablet Take 1 tablet by mouth Daily.    ferrous sulfate 325 (65 FE) MG tablet Take 1 tablet by mouth Daily With Breakfast.     "metoprolol succinate XL (TOPROL-XL) 25 MG 24 hr tablet Take 1 tablet by mouth Daily.    omeprazole (priLOSEC) 20 MG capsule Take 1 capsule by mouth Daily.    Sodium Sulfate-Mag Sulfate-KCl (Sutab) 6268-635-687 MG tablet Take 12 tablets by mouth Take As Directed. Indications: LOT: [id number] EXP: 08/31/2025     No current facility-administered medications on file prior to visit.       Objective   /78   Pulse 74   Ht 167.6 cm (66\")   Wt 83 kg (183 lb)   BMI 29.54 kg/m²       Physical Exam  HENT:      Head: Normocephalic.   Neck:      Vascular: No carotid bruit.   Cardiovascular:      Rate and Rhythm: Normal rate and regular rhythm.      Pulses: Normal pulses.      Heart sounds: Normal heart sounds. No murmur heard.  Pulmonary:      Effort: Pulmonary effort is normal.      Breath sounds: Normal breath sounds.   Musculoskeletal:      Cervical back: Neck supple.      Right lower leg: No edema.      Left lower leg: No edema.   Skin:     General: Skin is dry.   Neurological:      Mental Status: She is alert and oriented to person, place, and time.   Psychiatric:         Behavior: Behavior normal.       Result Review :   The following data was reviewed by: ADITYA Bruno on 03/11/2024:  No results found for: \"PROBNP\"      10/16/2023    10:57   CMP   Glucose 103    BUN 12    Creatinine 0.79    EGFR 91.8    Sodium 140    Potassium 4.5    Chloride 105    Calcium 9.9    Total Protein 7.8    Albumin 4.7    Globulin 3.1    Total Bilirubin 0.2    Alkaline Phosphatase 63    AST (SGOT) 28    ALT (SGPT) 31    Albumin/Globulin Ratio 1.5    BUN/Creatinine Ratio 15.2    Anion Gap 11.1          2/5/2024    09:48   CBC w/Diff   WBC 6.90    RBC 4.38    Hemoglobin 11.6    Hematocrit 35.8    MCV 81.7    MCH 26.5    MCHC 32.4    RDW 15.0    Platelets 303    Neutrophil Rel % 63.9    Immature Granulocyte Rel % 0.4    Lymphocyte Rel % 26.2    Monocyte Rel % 7.5    Eosinophil Rel % 1.6    Basophil Rel % 0.4       Lab Results " "  Component Value Date    TSH 2.210 10/16/2023      Lab Results   Component Value Date    FREET4 0.99 10/16/2023      No results found for: \"DDIMERQUANT\"  Magnesium   Date Value Ref Range Status   10/16/2023 2.3 1.6 - 2.6 mg/dL Final      No results found for: \"DIGOXIN\"   No results found for: \"TROPONINT\"            10/16/2023    10:57   Lipid Panel   Total Cholesterol 300    Triglycerides 183    HDL Cholesterol 58    VLDL Cholesterol 35    LDL Cholesterol  207    LDL/HDL Ratio 3.54    The 10-year ASCVD risk score (Dangelo ORTIZ, et al., 2019) is: 1.8%    Values used to calculate the score:      Age: 48 years      Sex: Female      Is Non- : No      Diabetic: No      Tobacco smoker: No      Systolic Blood Pressure: 124 mmHg      Is BP treated: No      HDL Cholesterol: 58 mg/dL      Total Cholesterol: 300 mg/dL      Results for orders placed in visit on 11/22/23    Adult Transthoracic Echo Complete W/ Cont if Necessary Per Protocol    Interpretation Summary  Normal left-ventricular systolic function.  No significant valve abnormalities noted.         Assessment and Plan    Diagnoses and all orders for this visit:    1. Premature atrial complexes (Primary) & 2. Nonsustained ventricular tachycardia  Symptomatically improved, continue metoprolol 25 mg daily.  Avoid caffeine products.  Check BMP and magnesium level to assess electrolytes and renal function.  Check fasting lipid and hepatic function panel to assess need for dose adjustment in atorvastatin therapy.  -     Basic Metabolic Panel; Future  -     Magnesium; Future  -     Lipid Panel; Future  -     Hepatic Function Panel; Future          Follow Up   Return in about 1 year (around 3/11/2025) for Follow up with Dr Preciado.    Patient was given instructions and counseling regarding her condition or for health maintenance advice. Please see specific information pulled into the AVS if appropriate.     Tali Mcduffie  reports that she has " never smoked. She has never used smokeless tobacco.           Karmen Felix, APRN  03/12/24  15:27 EDT    Dictated Utilizing Dragon Dictation

## 2024-03-12 PROBLEM — I47.29 NONSUSTAINED VENTRICULAR TACHYCARDIA: Status: ACTIVE | Noted: 2024-03-12

## 2024-03-21 NOTE — PRE-PROCEDURE INSTRUCTIONS
"PAT call attempted.  No answer.  Detailed message with date and arrival time of 1330 given.  Come to entrance \"C\"; must have adult  for transportation home; may have two visitors; however, children under 12 must remain in waiting area; instructed on diet/clear liquids/NPO/bowel prep, if needed; may take normal meds two hours prior to arrival time except for blood thinners, antidiabetics, diuretics, and weight loss meds.  Instructed to return call to confirm receipt of instructions and for any questions.  Cardiac clearance noted in chart.  " Hydroxychloroquine Pregnancy And Lactation Text: This medication has been shown to cause fetal harm but it isn't assigned a Pregnancy Risk Category. There are small amounts excreted in breast milk.

## 2024-03-26 ENCOUNTER — ANESTHESIA EVENT (OUTPATIENT)
Dept: GASTROENTEROLOGY | Facility: HOSPITAL | Age: 49
End: 2024-03-26
Payer: COMMERCIAL

## 2024-03-26 VITALS — HEIGHT: 66 IN | BODY MASS INDEX: 30.07 KG/M2 | WEIGHT: 187.1 LBS

## 2024-03-26 NOTE — ANESTHESIA PREPROCEDURE EVALUATION
Anesthesia Evaluation     Patient summary reviewed and Nursing notes reviewed   NPO Solid Status: > 8 hours  NPO Liquid Status: > 2 hours           Airway   Mallampati: II  TM distance: >3 FB  Neck ROM: full  No difficulty expected  Dental    (+) upper dentures and lower dentures    Comment: Pt states her dentures are glued in tightly - is aware that we are not responsible for dental damage     Pulmonary - normal exam    breath sounds clear to auscultation  Cardiovascular - normal exam  Exercise tolerance: good (4-7 METS)    ECG reviewed  Patient on routine beta blocker  Rhythm: regular  Rate: normal    (+) dysrhythmias PAC, hyperlipidemia    ROS comment: Takes metoprolol     Neuro/Psych  (+) headaches (migraines)  GI/Hepatic/Renal/Endo    (+) GERD well controlled, renal disease- stones    Musculoskeletal     Abdominal    Substance History      OB/GYN      Comment:  Having periods - HCG ?      Other        ROS/Med Hx Other: ECHO 11/22/23:   Left ventricular systolic function is normal. Calculated left ventricular EF = 59.4%  The diameter of the inferior vena cava is 1.4 cm.  Trace mitral valve regurgitation is present.  Trace tricuspid valve regurgitation is present.    EKG 10/16/23: HR 72, SR, LBBB                Anesthesia Plan    ASA 3     general   total IV anesthesia  (Total IV Anesthesia    Patient understands anesthesia not responsible for dental damage.  )  intravenous induction     Anesthetic plan, risks, benefits, and alternatives have been provided, discussed and informed consent has been obtained with: patient.  Pre-procedure education provided  Plan discussed with CRNA.    CODE STATUS:

## 2024-03-26 NOTE — PROGRESS NOTES
"  Tali Mcduffie is a 48 y.o. female who presents to [facility] for follow up nutrition consult r/t diagnosis of prediabetes.  Pt previously seen by RD in October 2023.  Tali Mcduffie is referred by Dr. Moreno.    Past Medical History:   Diagnosis Date    Cataract     Cholelithiasis 2000    GERD (gastroesophageal reflux disease)     Glaucoma     Hyperlipidemia     Kidney stone     Premature atrial complexes 10/16/2023       Anthropometrics    167.6 cm (66\")  84.9 kg (187 lb 1.6 oz)  30.20 kg/m²    Medications    Current Outpatient Medications   Medication    atorvastatin    ferrous sulfate    metoprolol succinate XL    omeprazole    Sutab     Labs       Lab Results   Component Value Date    CHOL 300 (H) 10/16/2023    TRIG 183 (H) 10/16/2023    HDL 58 10/16/2023     (H) 10/16/2023     Most recent A1c result from October 2023.  Pt states she was prescribed metformin but has not started yet.    Reviewed carbohydrate counting, portion control, measuring and reading labels.  Discussed eating out and gave suggestions on controlling carbohydrate intake and making healthier food choices.   Discussed use of [place] Pal to track all intake and monitor progress.  Discussed use of CDP Isidro for nutrition label info.    Pt states she has increased her activity level.  Daily exercise encouraged (as recommended by healthcare provider). Discussed the benefits of exercise in lowering blood glucose, blood pressure, cholesterol, stress and controlling body weight.     Dietitian contact number provided.  Patient encouraged to call with questions or concerns.     Time spent with patient: 25 minutes    Mercedes Rico RDN, LD  02/19/2024    "

## 2024-03-27 ENCOUNTER — ANESTHESIA (OUTPATIENT)
Dept: GASTROENTEROLOGY | Facility: HOSPITAL | Age: 49
End: 2024-03-27
Payer: COMMERCIAL

## 2024-03-27 ENCOUNTER — HOSPITAL ENCOUNTER (OUTPATIENT)
Facility: HOSPITAL | Age: 49
Setting detail: HOSPITAL OUTPATIENT SURGERY
Discharge: HOME | End: 2024-03-27
Attending: SURGERY | Admitting: SURGERY
Payer: COMMERCIAL

## 2024-03-27 VITALS
BODY MASS INDEX: 28.61 KG/M2 | OXYGEN SATURATION: 97 % | RESPIRATION RATE: 18 BRPM | DIASTOLIC BLOOD PRESSURE: 75 MMHG | WEIGHT: 177.25 LBS | SYSTOLIC BLOOD PRESSURE: 119 MMHG | HEART RATE: 71 BPM | TEMPERATURE: 97.4 F

## 2024-03-27 DIAGNOSIS — Z12.11 SCREENING FOR MALIGNANT NEOPLASM OF COLON: ICD-10-CM

## 2024-03-27 LAB — HCG SERPL QL: NEGATIVE

## 2024-03-27 PROCEDURE — 25810000003 LACTATED RINGERS PER 1000 ML

## 2024-03-27 PROCEDURE — 88305 TISSUE EXAM BY PATHOLOGIST: CPT | Performed by: SURGERY

## 2024-03-27 PROCEDURE — 25010000002 PROPOFOL 500 MG/50ML EMULSION

## 2024-03-27 PROCEDURE — 25010000002 PROPOFOL 10 MG/ML EMULSION

## 2024-03-27 PROCEDURE — 84703 CHORIONIC GONADOTROPIN ASSAY: CPT

## 2024-03-27 RX ORDER — LIDOCAINE HYDROCHLORIDE 20 MG/ML
INJECTION, SOLUTION EPIDURAL; INFILTRATION; INTRACAUDAL; PERINEURAL AS NEEDED
Status: DISCONTINUED | OUTPATIENT
Start: 2024-03-27 | End: 2024-03-27 | Stop reason: SURG

## 2024-03-27 RX ORDER — SODIUM CHLORIDE 0.9 % (FLUSH) 0.9 %
10 SYRINGE (ML) INJECTION AS NEEDED
Status: DISCONTINUED | OUTPATIENT
Start: 2024-03-27 | End: 2024-03-27 | Stop reason: HOSPADM

## 2024-03-27 RX ORDER — SODIUM CHLORIDE, SODIUM LACTATE, POTASSIUM CHLORIDE, CALCIUM CHLORIDE 600; 310; 30; 20 MG/100ML; MG/100ML; MG/100ML; MG/100ML
30 INJECTION, SOLUTION INTRAVENOUS CONTINUOUS
Status: DISCONTINUED | OUTPATIENT
Start: 2024-03-27 | End: 2024-03-27 | Stop reason: HOSPADM

## 2024-03-27 RX ORDER — SODIUM CHLORIDE 0.9 % (FLUSH) 0.9 %
3 SYRINGE (ML) INJECTION EVERY 12 HOURS SCHEDULED
Status: DISCONTINUED | OUTPATIENT
Start: 2024-03-27 | End: 2024-03-27 | Stop reason: HOSPADM

## 2024-03-27 RX ORDER — PROPOFOL 10 MG/ML
VIAL (ML) INTRAVENOUS AS NEEDED
Status: DISCONTINUED | OUTPATIENT
Start: 2024-03-27 | End: 2024-03-27 | Stop reason: SURG

## 2024-03-27 RX ORDER — SODIUM CHLORIDE 9 MG/ML
40 INJECTION, SOLUTION INTRAVENOUS AS NEEDED
Status: DISCONTINUED | OUTPATIENT
Start: 2024-03-27 | End: 2024-03-27 | Stop reason: HOSPADM

## 2024-03-27 RX ORDER — PROPOFOL 10 MG/ML
INJECTION, EMULSION INTRAVENOUS AS NEEDED
Status: DISCONTINUED | OUTPATIENT
Start: 2024-03-27 | End: 2024-03-27 | Stop reason: SURG

## 2024-03-27 RX ADMIN — PROPOFOL 200 MCG/KG/MIN: 10 INJECTION, EMULSION INTRAVENOUS at 15:24

## 2024-03-27 RX ADMIN — PROPOFOL 100 MG: 10 INJECTION, EMULSION INTRAVENOUS at 15:34

## 2024-03-27 RX ADMIN — PROPOFOL 100 MG: 10 INJECTION, EMULSION INTRAVENOUS at 15:24

## 2024-03-27 RX ADMIN — LIDOCAINE HYDROCHLORIDE 50 MG: 20 INJECTION, SOLUTION EPIDURAL; INFILTRATION; INTRACAUDAL; PERINEURAL at 15:24

## 2024-03-27 RX ADMIN — SODIUM CHLORIDE, POTASSIUM CHLORIDE, SODIUM LACTATE AND CALCIUM CHLORIDE: 600; 310; 30; 20 INJECTION, SOLUTION INTRAVENOUS at 15:24

## 2024-03-27 NOTE — ANESTHESIA POSTPROCEDURE EVALUATION
Patient: Tali Mcduffie    Procedure Summary       Date: 03/27/24 Room / Location: [facility] ENDOSCOPY 1 / [facility] ENDOSCOPY    Anesthesia Start: 1522 Anesthesia Stop: 1549    Procedure: COLONOSCOPY with biopsy Diagnosis:       Screening for malignant neoplasm of colon      (Screening for malignant neoplasm of colon [Z12.11])    Surgeons: Vinny Lny MD Provider: Te Moore CRNA    Anesthesia Type: general ASA Status: 3            Anesthesia Type: general    Vitals  Vitals Value Taken Time   /75 03/27/24 1603   Temp 36.3 °C (97.4 °F) 03/27/24 1602   Pulse 70 03/27/24 1604   Resp 18 03/27/24 1602   SpO2 98 % 03/27/24 1604   Vitals shown include unfiled device data.        Post Anesthesia Care and Evaluation    Post-procedure mental status: acceptable.  Pain management: satisfactory to patient    Airway patency: patent  Anesthetic complications: No anesthetic complications    Cardiovascular status: acceptable  Respiratory status: acceptable    Comments: Per chart review

## 2024-03-27 NOTE — H&P
Chief Complaint: Colonoscopy (consult)    Patient is here to have a colonoscopy.  Following is a copy of the HPI from the patient's office visit at the surgery clinic.    History of Present Illness  Tali Mcduffie is a 48 y.o. female presents to [facility] for colonoscopy consultation.    Patient presents today on referral from Dr. Navneet Moreno for colonoscopy consultation.  Patient denies any abdominal pain, change in bowel habit, or rectal bleeding.  Denies any family history of colorectal cancer.  Reports her mother always has polyps removed.  No previous colonoscopy.    Patient denies LAKISHA.  Denies any cardiac issues.  Denies taking a GLP-1 receptors.    Objective     Past Medical History:   Diagnosis Date    Cataract     Cholelithiasis 2000    GERD (gastroesophageal reflux disease)     Glaucoma     Headache     History of medical problems     Hyperlipidemia     Hypertension     Kidney stone        Past Surgical History:   Procedure Laterality Date    APPENDECTOMY      CHOLECYSTECTOMY      TONSILLECTOMY      WISDOM TOOTH EXTRACTION Bilateral        Outpatient Medications Marked as Taking for the 12/11/23 encounter (Office Visit) with Laury Murray APRN   Medication Sig Dispense Refill    omeprazole (priLOSEC) 20 MG capsule Take 1 capsule by mouth Daily. 30 capsule 3    [DISCONTINUED] atorvastatin (Lipitor) 40 MG tablet Take 1 tablet by mouth Daily. 90 tablet 1    [DISCONTINUED] metoprolol succinate XL (TOPROL-XL) 25 MG 24 hr tablet Take 1 tablet by mouth Daily. 30 tablet 2       Allergies   Allergen Reactions    Penicillins Hives and Rash        Family History   Problem Relation Age of Onset    Diabetes Mother     Heart disease Father     Other Sister        Social History     Socioeconomic History    Marital status: Single   Tobacco Use    Smoking status: Never    Smokeless tobacco: Never   Vaping Use    Vaping Use: Never used   Substance and Sexual Activity    Alcohol use:  Not Currently     Comment: social    Drug use: Never    Sexual activity: Not Currently     Partners: Male     Birth control/protection: None     Physical Exam  Vitals - Available in the EMR.   Respiratory:  breathing not labored, respiratory effort appears normal  Cardiovascular:  heart regular rate  Skin and subcutaneous tissue:  warm and dry  Musculoskeletal: moving all extremities symmetrically and purposefully  Neurologic:  no obvious motor or sensory deficits, speech clear  Psychiatric:  judgment and insight intact, mood normal      Assessment   Screening colonoscopy    Plan  Colonoscopy    Risks and benefits discussed    Vinny Lyn M.D.  03/27/24    Electronically signed by Vinny Lyn MD, 03/27/24, 2:47 PM EDT.

## 2024-03-29 LAB
CYTO UR: NORMAL
LAB AP CASE REPORT: NORMAL
LAB AP CLINICAL INFORMATION: NORMAL
PATH REPORT.FINAL DX SPEC: NORMAL
PATH REPORT.GROSS SPEC: NORMAL

## 2024-04-24 DIAGNOSIS — R31.29 MICROSCOPIC HEMATURIA: Primary | ICD-10-CM

## 2024-05-06 ENCOUNTER — HOSPITAL ENCOUNTER (OUTPATIENT)
Dept: CT IMAGING | Facility: HOSPITAL | Age: 49
Discharge: HOME | End: 2024-05-06
Admitting: NURSE PRACTITIONER
Payer: COMMERCIAL

## 2024-05-06 DIAGNOSIS — R31.29 MICROSCOPIC HEMATURIA: ICD-10-CM

## 2024-05-06 PROCEDURE — 25510000001 IOPAMIDOL PER 1 ML: Performed by: NURSE PRACTITIONER

## 2024-05-06 PROCEDURE — 74178 CT ABD&PLV WO CNTR FLWD CNTR: CPT

## 2024-05-06 RX ADMIN — IOPAMIDOL 100 ML: 755 INJECTION, SOLUTION INTRAVENOUS at 13:01

## 2024-05-20 ENCOUNTER — OFFICE VISIT (OUTPATIENT)
Dept: FAMILY MEDICINE CLINIC | Facility: CLINIC | Age: 49
End: 2024-05-20
Payer: COMMERCIAL

## 2024-05-20 VITALS
DIASTOLIC BLOOD PRESSURE: 69 MMHG | SYSTOLIC BLOOD PRESSURE: 131 MMHG | BODY MASS INDEX: 29.57 KG/M2 | HEIGHT: 66 IN | HEART RATE: 60 BPM | OXYGEN SATURATION: 96 % | WEIGHT: 184 LBS

## 2024-05-20 DIAGNOSIS — G44.89 OTHER HEADACHE SYNDROME: Primary | ICD-10-CM

## 2024-05-20 DIAGNOSIS — D50.8 OTHER IRON DEFICIENCY ANEMIA: ICD-10-CM

## 2024-05-20 PROCEDURE — 99213 OFFICE O/P EST LOW 20 MIN: CPT | Performed by: STUDENT IN AN ORGANIZED HEALTH CARE EDUCATION/TRAINING PROGRAM

## 2024-05-20 NOTE — PROGRESS NOTES
Subjective:       Tali Mcduffie is a 48 y.o. female with a concurrent medical history of hyperlipidemia, headaches, and gastroesophageal reflux disease who presents for follow-up on headaches and palpitations.      In terms of headaches, she had initially reported improvement on metoprolol but headaches are now worse again.  Although she told me previously she had been diagnosed in 2001 with migraine, she had been on many medicines without relief.  She had told me at 1 point she been on Topamax for prophylaxis but did not think it made a difference as well as Imitrex for abortive therapy that resulted in rebound headache.  Since she has now failed treatment with both Topamax and metoprolol, would refer to neurology for further evaluation.    In terms of palpitations, minus workup was negative and I referred her to cardiology.  She says their workup was negative as well.  I did ask her if it is possible she has some anxiety symptoms then we could address that with medication and evaluation.  We discussed this in the future if she wishes.      The following portions of the patient's history were reviewed and updated as appropriate: allergies, current medications, past family history, past medical history, past social history, past surgical history, and problem list.    Past Medical Hx:  Past Medical History:   Diagnosis Date    Cataract     Cholelithiasis 2000    GERD (gastroesophageal reflux disease)     Glaucoma     Hyperlipidemia     Hypertension     Kidney stone     Premature atrial complexes 10/16/2023       Past Surgical Hx:  Past Surgical History:   Procedure Laterality Date    APPENDECTOMY      CHOLECYSTECTOMY      COLONOSCOPY      COLONOSCOPY N/A 3/27/2024    Procedure: COLONOSCOPY with biopsy;  Surgeon: Vinny Lyn MD;  Location: [facility] ENDOSCOPY;  Service: General;  Laterality: N/A;  colon polyp    TONSILLECTOMY      WISDOM TOOTH EXTRACTION Bilateral        Current Meds:    Current Outpatient  "Medications:     atorvastatin (Lipitor) 40 MG tablet, Take 1 tablet by mouth Daily., Disp: 90 tablet, Rfl: 1    ferrous sulfate 325 (65 FE) MG tablet, Take 1 tablet by mouth Daily With Breakfast., Disp: 90 tablet, Rfl: 1    metoprolol succinate XL (TOPROL-XL) 25 MG 24 hr tablet, Take 1 tablet by mouth Daily., Disp: 30 tablet, Rfl: 11    omeprazole (priLOSEC) 20 MG capsule, Take 1 capsule by mouth Daily., Disp: 30 capsule, Rfl: 3    Sodium Sulfate-Mag Sulfate-KCl (Sutab) 4923-318-911 MG tablet, Take 12 tablets by mouth Take As Directed. Indications: LOT: [id number] EXP: 08/31/2025 (Patient not taking: Reported on 5/20/2024), Disp: 24 tablet, Rfl: 0    Allergies:  Allergies   Allergen Reactions    Penicillins Hives and Rash       Family Hx:  Family History   Problem Relation Age of Onset    Diabetes Mother     Heart disease Father     Other Sister         Social History:  Social History     Socioeconomic History    Marital status: Single   Tobacco Use    Smoking status: Never    Smokeless tobacco: Never   Vaping Use    Vaping status: Never Used   Substance and Sexual Activity    Alcohol use: Not Currently     Comment: social    Drug use: Never    Sexual activity: Not Currently     Partners: Male     Birth control/protection: None       Review of Systems  Review of Systems   Cardiovascular:  Positive for palpitations.   Neurological:  Positive for headaches.       Objective:     /69   Pulse 60   Ht 167.6 cm (66\")   Wt 83.5 kg (184 lb)   SpO2 96%   BMI 29.70 kg/m²   Physical Exam  Constitutional:       General: She is not in acute distress.     Appearance: Normal appearance. She is not ill-appearing, toxic-appearing or diaphoretic.   Cardiovascular:      Rate and Rhythm: Normal rate and regular rhythm.   Pulmonary:      Effort: Pulmonary effort is normal. No respiratory distress.      Breath sounds: Normal breath sounds.   Neurological:      Mental Status: She is alert.   Psychiatric:         Mood and Affect: " Mood normal.         Behavior: Behavior normal.          Assessment/Plan:     Diagnoses and all orders for this visit:    1. Other headache syndrome (Primary)      In terms of headaches, she had initially reported improvement on metoprolol but headaches are now worse again.  Although she told me previously she had been diagnosed in 2001 with migraine, she had been on many medicines without relief.  She had told me at 1 point she been on Topamax for prophylaxis but did not think it made a difference as well as Imitrex for abortive therapy that resulted in rebound headache.  Since she has now failed treatment with both Topamax and metoprolol, would refer to neurology for further evaluation.    -     Ambulatory Referral to Neurology      3. Other iron deficiency anemia    She has been on replacement for anemia with iron.  Will repeat CBC today to see if we can discontinue replacement.    -     CBC No Differential; Future          Rx changes: None    Follow-up:     Return in about 6 months (around 11/20/2024) for Annual physical.    Preventative:  Health Maintenance   Topic Date Due    MAMMOGRAM  07/02/2016    ANNUAL PHYSICAL  Never done    COVID-19 Vaccine (1 - 2023-24 season) Never done    INFLUENZA VACCINE  08/01/2024    LIPID PANEL  10/16/2024    BMI FOLLOWUP  02/19/2025    TDAP/TD VACCINES (2 - Td or Tdap) 05/09/2026    PAP SMEAR  11/28/2026    COLORECTAL CANCER SCREENING  03/27/2029    HEPATITIS C SCREENING  Completed    Pneumococcal Vaccine 0-64  Aged Out       This document has been electronically signed by Navneet Moreno MD on May 20, 2024 16:48 EDT       Parts of this note are electronic transcriptions/translations of spoken language to printed text using the Dragon Dictation system.

## 2024-05-21 ENCOUNTER — LAB (OUTPATIENT)
Dept: LAB | Facility: HOSPITAL | Age: 49
End: 2024-05-21
Payer: COMMERCIAL

## 2024-05-21 DIAGNOSIS — D50.8 OTHER IRON DEFICIENCY ANEMIA: ICD-10-CM

## 2024-05-21 DIAGNOSIS — I49.1 PREMATURE ATRIAL COMPLEXES: ICD-10-CM

## 2024-05-21 LAB
ALBUMIN SERPL-MCNC: 4 G/DL (ref 3.5–5.2)
ALP SERPL-CCNC: 83 U/L (ref 39–117)
ALT SERPL W P-5'-P-CCNC: 28 U/L (ref 1–33)
ANION GAP SERPL CALCULATED.3IONS-SCNC: 11.3 MMOL/L (ref 5–15)
AST SERPL-CCNC: 19 U/L (ref 1–32)
BILIRUB CONJ SERPL-MCNC: <0.2 MG/DL (ref 0–0.3)
BILIRUB INDIRECT SERPL-MCNC: NORMAL MG/DL
BILIRUB SERPL-MCNC: 0.2 MG/DL (ref 0–1.2)
BUN SERPL-MCNC: 13 MG/DL (ref 6–20)
BUN/CREAT SERPL: 17.8 (ref 7–25)
CALCIUM SPEC-SCNC: 9.3 MG/DL (ref 8.6–10.5)
CHLORIDE SERPL-SCNC: 105 MMOL/L (ref 98–107)
CHOLEST SERPL-MCNC: 176 MG/DL (ref 0–200)
CO2 SERPL-SCNC: 22.7 MMOL/L (ref 22–29)
CREAT SERPL-MCNC: 0.73 MG/DL (ref 0.57–1)
DEPRECATED RDW RBC AUTO: 45.2 FL (ref 37–54)
EGFRCR SERPLBLD CKD-EPI 2021: 101 ML/MIN/1.73
ERYTHROCYTE [DISTWIDTH] IN BLOOD BY AUTOMATED COUNT: 14.8 % (ref 12.3–15.4)
GLUCOSE SERPL-MCNC: 103 MG/DL (ref 65–99)
HCT VFR BLD AUTO: 38.6 % (ref 34–46.6)
HDLC SERPL-MCNC: 48 MG/DL (ref 40–60)
HGB BLD-MCNC: 12.7 G/DL (ref 12–15.9)
LDLC SERPL CALC-MCNC: 92 MG/DL (ref 0–100)
LDLC/HDLC SERPL: 1.79 {RATIO}
MAGNESIUM SERPL-MCNC: 2 MG/DL (ref 1.6–2.6)
MCH RBC QN AUTO: 27.8 PG (ref 26.6–33)
MCHC RBC AUTO-ENTMCNC: 32.9 G/DL (ref 31.5–35.7)
MCV RBC AUTO: 84.5 FL (ref 79–97)
PLATELET # BLD AUTO: 269 10*3/MM3 (ref 140–450)
PMV BLD AUTO: 9.7 FL (ref 6–12)
POTASSIUM SERPL-SCNC: 4 MMOL/L (ref 3.5–5.2)
PROT SERPL-MCNC: 7.1 G/DL (ref 6–8.5)
RBC # BLD AUTO: 4.57 10*6/MM3 (ref 3.77–5.28)
SODIUM SERPL-SCNC: 139 MMOL/L (ref 136–145)
TRIGL SERPL-MCNC: 210 MG/DL (ref 0–150)
VLDLC SERPL-MCNC: 36 MG/DL (ref 5–40)
WBC NRBC COR # BLD AUTO: 6.37 10*3/MM3 (ref 3.4–10.8)

## 2024-05-21 PROCEDURE — 85027 COMPLETE CBC AUTOMATED: CPT

## 2024-05-21 PROCEDURE — 80076 HEPATIC FUNCTION PANEL: CPT

## 2024-05-21 PROCEDURE — 36415 COLL VENOUS BLD VENIPUNCTURE: CPT

## 2024-05-21 PROCEDURE — 80048 BASIC METABOLIC PNL TOTAL CA: CPT

## 2024-05-21 PROCEDURE — 83735 ASSAY OF MAGNESIUM: CPT

## 2024-05-21 PROCEDURE — 80061 LIPID PANEL: CPT

## 2024-05-22 ENCOUNTER — TELEPHONE (OUTPATIENT)
Dept: CARDIOLOGY | Facility: CLINIC | Age: 49
End: 2024-05-22
Payer: COMMERCIAL

## 2024-05-22 NOTE — TELEPHONE ENCOUNTER
PER ADITYA DYKES-  Renal function and electrolytes are good, lipid panel is improved however triglycerides remain elevated.  CBC shows that blood counts are all in normal range.  Liver enzymes are in normal range.  Recommend low-fat/low-carb diet with less red meat.  Continue current medication.       Sent via my chart.

## 2024-05-23 ENCOUNTER — TELEPHONE (OUTPATIENT)
Dept: FAMILY MEDICINE CLINIC | Facility: CLINIC | Age: 49
End: 2024-05-23
Payer: COMMERCIAL

## 2024-05-23 NOTE — PROGRESS NOTES
Anemia has resolved.  Ms. Orta can stop iron supplementation.    ?  This document has been electronically signed by Navneet Moreno MD on May 23, 2024 08:28 EDT

## 2024-05-29 ENCOUNTER — TELEMEDICINE (OUTPATIENT)
Dept: FAMILY MEDICINE CLINIC | Facility: TELEHEALTH | Age: 49
End: 2024-05-29
Payer: COMMERCIAL

## 2024-05-29 VITALS — OXYGEN SATURATION: 97 % | TEMPERATURE: 98.1 F | HEART RATE: 74 BPM

## 2024-05-29 DIAGNOSIS — R05.1 ACUTE COUGH: Primary | ICD-10-CM

## 2024-05-29 NOTE — PROGRESS NOTES
You have chosen to receive care through a telehealth visit.  Do you consent to use a video/audio connection for your medical care today? Yes     HPI  Tali Mcduffie is a 48 y.o. female  presents with complaint of rare dry cough and chest feels like she can't get a big normal breath. She reports no chest pain, wheezing, shortness of air or jaw or arm pain. She has been stressed over a nodule she has felt in her right axillary. She is going for an assessment of this on Friday. She reports no fever.     Review of Systems   Constitutional: Negative.    HENT: Negative.     Respiratory:  Positive for cough and shortness of breath. Negative for chest tightness, wheezing and stridor.    Cardiovascular: Negative.    Gastrointestinal: Negative.    Musculoskeletal: Negative.    Neurological: Negative.    Hematological:  Positive for adenopathy.        Right axillary lymph node swelling as reported by patient.    Psychiatric/Behavioral: Negative.         Past Medical History:   Diagnosis Date    Cataract     Cholelithiasis 2000    GERD (gastroesophageal reflux disease)     Glaucoma     Hyperlipidemia     Hypertension     Kidney stone     Premature atrial complexes 10/16/2023       Family History   Problem Relation Age of Onset    Diabetes Mother     Heart disease Father     Other Sister        Social History     Socioeconomic History    Marital status: Single   Tobacco Use    Smoking status: Never    Smokeless tobacco: Never   Vaping Use    Vaping status: Never Used   Substance and Sexual Activity    Alcohol use: Not Currently     Comment: social    Drug use: Never    Sexual activity: Not Currently     Partners: Male     Birth control/protection: None         There were no vitals taken for this visit.    PHYSICAL EXAM  Physical Exam   Constitutional: She is oriented to person, place, and time. She appears well-developed and well-nourished. She does not have a sickly appearance. She does not appear ill. No distress.   HENT:    Head: Normocephalic and atraumatic.   Nose: Nose normal. No mucosal edema, rhinorrhea or congestion.   Mouth/Throat: Mouth/Lips are normal.Oropharynx is clear and moist and mucous membranes are normal.   Pulmonary/Chest: Effort normal.  No respiratory distress.  Neurological: She is alert and oriented to person, place, and time.   Psychiatric: She has a normal mood and affect.       Diagnoses and all orders for this visit:    1. Acute cough (Primary)    Exam normal today.   Would recommend rest and fluids   For any worsening s/s follow up with PCP or virtual care.   Follow up with PCP on Friday       FOLLOW-UP  As discussed during visit with Virtual Care, if symptoms worsen or fail to improve, follow-up with PCP/Urgent Care/Emergency Department.    Patient verbalizes understanding of medications, instructions for treatment and follow-up.    Margy Soni, ADITYA  05/29/2024  17:31 EDT    The use of a video visit has been reviewed with the patient and verbal informed consent has been obtained. Myself and Tali Mcduffie participated in this visit. The patient is located in [facility], and I am located in [place], [place]. MyChart and Tyto  were utilized.

## 2024-05-29 NOTE — PATIENT INSTRUCTIONS
Cough, Adult  A cough helps to clear your throat and lungs. It may be a sign of an illness or another condition.  A short-term (acute) cough may last 2-3 weeks. A long-term (chronic) cough may last 8 or more weeks.  Many things can cause a cough. They include:  Illnesses such as:  An infection in your throat or lungs.  Asthma or other heart or lung problems.  Gastroesophageal reflux. This is when acid comes back up from your stomach.  Breathing in things that bother (irritate) your lungs.  Allergies.  Postnasal drip. This is when mucus runs down the back of your throat.  Smoking.  Some medicines.  Follow these instructions at home:  Medicines  Take over-the-counter and prescription medicines only as told by your doctor.  Talk with your doctor before you take cough medicine (cough suppressants).  Eating and drinking  Do not drink alcohol.  Do not drink caffeine.  Drink enough fluid to keep your pee (urine) pale yellow.  Lifestyle  Stay away from cigarette smoke.  Do not smoke or use any products that contain nicotine or tobacco. If you need help quitting, ask your doctor.  Stay away from things that make you cough. These may include perfume, candles, cleaning products, or campfire smoke.  General instructions    Watch for any changes to your cough. Tell your doctor about them.  Always cover your mouth when you cough.  If the air is dry in your home, use a cool mist vaporizer or humidifier.  If your cough is worse at night, try using extra pillows to raise your head up higher while you sleep.  Rest as needed.  Contact a doctor if:  You have new symptoms.  Your symptoms get worse.  You cough up pus.  You have a fever that does not go away.  Your cough does not get better after 2-3 weeks.  Cough medicine does not help, and you are not sleeping well.  You have pain that gets worse or is not helped with medicine.  You are losing weight and do not know why.  You have night sweats.  Get help right away if:  You cough up  blood.  You have trouble breathing.  Your heart is beating very fast.  These symptoms may be an emergency. Get help right away. Call 911.  Do not wait to see if the symptoms will go away.  Do not drive yourself to the hospital.  This information is not intended to replace advice given to you by your health care provider. Make sure you discuss any questions you have with your health care provider.  Document Revised: 08/18/2023 Document Reviewed: 08/18/2023  Elsevier Patient Education © 2024 [employer]

## 2024-05-31 ENCOUNTER — OFFICE VISIT (OUTPATIENT)
Dept: FAMILY MEDICINE CLINIC | Facility: CLINIC | Age: 49
End: 2024-05-31
Payer: COMMERCIAL

## 2024-05-31 VITALS
TEMPERATURE: 97.5 F | HEIGHT: 66 IN | OXYGEN SATURATION: 98 % | SYSTOLIC BLOOD PRESSURE: 137 MMHG | DIASTOLIC BLOOD PRESSURE: 89 MMHG | HEART RATE: 83 BPM | WEIGHT: 187 LBS | BODY MASS INDEX: 30.05 KG/M2

## 2024-05-31 DIAGNOSIS — R59.1 LYMPHADENOPATHY: Primary | ICD-10-CM

## 2024-05-31 PROCEDURE — 99213 OFFICE O/P EST LOW 20 MIN: CPT | Performed by: STUDENT IN AN ORGANIZED HEALTH CARE EDUCATION/TRAINING PROGRAM

## 2024-05-31 NOTE — PROGRESS NOTES
"    Subjective:       Tali Mcduffie is a 48 y.o. female who presents for walk-in visit to discuss some potential painful lymphadenopathy.    Tali first noticed a right sided axillary \"lump\" approximately 1 to 2 months ago.  It became painful approximately 2 weeks ago but has subsequently begun shrinking in size.  She requests evaluation to discuss this further.    We discussed that lymphadenopathy is usually benign and self-limited but there can be other causes including malignancy, infection, autoimmune disorders, and other causes.    She is unable to describe an inciting injury, although she does say she has been working out recently, or infectious cause.  In patients unable to identify cause, lymphadenopathy is then categorized as generalized or localized.  I palpated submandibular, supraclavicular, cervical lymph nodes as well as axillary lymph nodes and do not appreciate generalized lymphadenopathy.  In the area that she describes in the axillary region, I do not palpate diffuse lymphadenopathy but what is perhaps an enlarged lymph node or perhaps a cystic structure.    Reassuringly, Tali denies systemic symptoms such as fever, night sweats, and unexplained weight loss and has a normal white blood cell count.    Because the area in question is shrinking, it is possibly will resolve on its own, which would not require further workup.  I asked Tali to continue to monitor this for resolution.  If it persists or begins to enlarge or she develops the after mentioned symptoms, I would obtain specific testing with ultrasound.  I did offer her the option to proceed to ultrasound today but she does decide to defer this for the time being    Tali is due for a mammogram and has not had one yet.  I had already ordered this and it has been scheduled for later this year.  I encouraged her to make sure she obtains that.      The following portions of the patient's history were reviewed and updated as " appropriate: allergies, current medications, past family history, past medical history, past social history, past surgical history, and problem list.    Past Medical Hx:  Past Medical History:   Diagnosis Date    Cataract     Cholelithiasis 2000    GERD (gastroesophageal reflux disease)     Glaucoma     Hyperlipidemia     Hypertension     Kidney stone     Premature atrial complexes 10/16/2023       Past Surgical Hx:  Past Surgical History:   Procedure Laterality Date    APPENDECTOMY      CHOLECYSTECTOMY      COLONOSCOPY      COLONOSCOPY N/A 3/27/2024    Procedure: COLONOSCOPY with biopsy;  Surgeon: Vinny Lyn MD;  Location: [facility];  Service: General;  Laterality: N/A;  colon polyp    TONSILLECTOMY      WISDOM TOOTH EXTRACTION Bilateral        Current Meds:    Current Outpatient Medications:     atorvastatin (Lipitor) 40 MG tablet, Take 1 tablet by mouth Daily., Disp: 90 tablet, Rfl: 1    metoprolol succinate XL (TOPROL-XL) 25 MG 24 hr tablet, Take 1 tablet by mouth Daily., Disp: 30 tablet, Rfl: 11    omeprazole (priLOSEC) 20 MG capsule, Take 1 capsule by mouth Daily., Disp: 30 capsule, Rfl: 3    Allergies:  Allergies   Allergen Reactions    Penicillins Hives and Rash       Family Hx:  Family History   Problem Relation Age of Onset    Diabetes Mother     Heart disease Father     Other Sister         Social History:  Social History     Socioeconomic History    Marital status: Single   Tobacco Use    Smoking status: Never    Smokeless tobacco: Never   Vaping Use    Vaping status: Never Used   Substance and Sexual Activity    Alcohol use: Not Currently     Comment: social    Drug use: Never    Sexual activity: Not Currently     Partners: Male     Birth control/protection: None       Review of Systems  Review of Systems   Constitutional:  Negative for diaphoresis, fever and unexpected weight change.       Objective:     /89 (BP Location: Left arm, Patient Position: Sitting)   Pulse 83   Temp 97.5  "°F (36.4 °C) (Temporal)   Ht 167.6 cm (66\")   Wt 84.8 kg (187 lb)   SpO2 98%   BMI 30.18 kg/m²   Physical Exam  Constitutional:       General: She is not in acute distress.     Appearance: Normal appearance. She is not ill-appearing, toxic-appearing or diaphoretic.   Pulmonary:      Effort: Pulmonary effort is normal. No respiratory distress.   Musculoskeletal:      Cervical back: Neck supple.   Lymphadenopathy:      Cervical: No cervical adenopathy.   Neurological:      Mental Status: She is alert.   Psychiatric:         Mood and Affect: Mood normal.         Behavior: Behavior normal.          Assessment/Plan:     Diagnoses and all orders for this visit:    1. Lymphadenopathy (Primary)      Tali first noticed a right sided axillary \"lump\" approximately 1 to 2 months ago.  It became painful approximately 2 weeks ago but has subsequently begun shrinking in size.  She requests evaluation to discuss this further.    We discussed that lymphadenopathy is usually benign and self-limited but there can be other causes including malignancy, infection, autoimmune disorders, and other causes.    She is unable to describe an inciting injury, although she does say she has been working out recently, or infectious cause.  In patients unable to identify cause, lymphadenopathy is then categorized as generalized or localized.  I palpated submandibular, supraclavicular, cervical lymph nodes as well as axillary lymph nodes and do not appreciate generalized lymphadenopathy.  In the area that she describes in the axillary region, I do not palpate diffuse lymphadenopathy but what is perhaps an enlarged lymph node or perhaps a cystic structure.    Reassuringly, Tali denies systemic symptoms such as fever, night sweats, and unexplained weight loss and has a normal white blood cell count.    Because the area in question is shrinking, it is possibly will resolve on its own, which would not require further workup.  I asked " Tali to continue to monitor this for resolution.  If it persists or begins to enlarge or she develops the after mentioned symptoms, I would obtain specific testing with ultrasound.  I did offer her the option to proceed to ultrasound today but she does decide to defer this for the time being    Tali is due for a mammogram and has not had one yet.  I had already ordered this and it has been scheduled for later this year.  I encouraged her to make sure she obtains that.      Rx changes: None    Follow-up:     Return if symptoms worsen or fail to improve, for Next scheduled follow up.    Preventative:  Health Maintenance   Topic Date Due    MAMMOGRAM  07/02/2016    ANNUAL PHYSICAL  Never done    COVID-19 Vaccine (1 - 2023-24 season) 06/02/2024 (Originally 9/1/2023)    INFLUENZA VACCINE  08/01/2024    BMI FOLLOWUP  02/19/2025    LIPID PANEL  05/21/2025    TDAP/TD VACCINES (2 - Td or Tdap) 05/09/2026    PAP SMEAR  11/28/2026    COLORECTAL CANCER SCREENING  03/27/2029    HEPATITIS C SCREENING  Completed    Pneumococcal Vaccine 0-64  Aged Out       This document has been electronically signed by Navneet Moreno MD on May 31, 2024 15:37 EDT       Parts of this note are electronic transcriptions/translations of spoken language to printed text using the Dragon Dictation system.

## 2024-06-10 ENCOUNTER — PROCEDURE VISIT (OUTPATIENT)
Dept: UROLOGY | Facility: CLINIC | Age: 49
End: 2024-06-10
Payer: COMMERCIAL

## 2024-06-10 VITALS
BODY MASS INDEX: 29.09 KG/M2 | WEIGHT: 181 LBS | DIASTOLIC BLOOD PRESSURE: 82 MMHG | HEIGHT: 66 IN | SYSTOLIC BLOOD PRESSURE: 126 MMHG

## 2024-06-10 DIAGNOSIS — R31.29 MICROSCOPIC HEMATURIA: Primary | ICD-10-CM

## 2024-06-10 LAB
BILIRUB BLD-MCNC: NEGATIVE MG/DL
CLARITY, POC: CLEAR
COLOR UR: YELLOW
EXPIRATION DATE: ABNORMAL
GLUCOSE UR STRIP-MCNC: NEGATIVE MG/DL
KETONES UR QL: ABNORMAL
LEUKOCYTE EST, POC: NEGATIVE
Lab: ABNORMAL
NITRITE UR-MCNC: NEGATIVE MG/ML
PH UR: 5.5 [PH] (ref 5–8)
PROT UR STRIP-MCNC: ABNORMAL MG/DL
RBC # UR STRIP: ABNORMAL /UL
SP GR UR: 1.03 (ref 1–1.03)
UROBILINOGEN UR QL: ABNORMAL

## 2024-06-10 PROCEDURE — 81003 URINALYSIS AUTO W/O SCOPE: CPT | Performed by: UROLOGY

## 2024-06-10 PROCEDURE — 52000 CYSTOURETHROSCOPY: CPT | Performed by: UROLOGY

## 2024-06-10 NOTE — PROGRESS NOTES
Cystoscopy    Date/Time: 6/10/2024 1:25 PM    Performed by: Ying Luna MD  Authorized by: Ying Luna MD  Preparation: Patient was prepped and draped in the usual sterile fashion.  Local anesthesia used: no    Anesthesia:  Local anesthesia used: no    Sedation:  Patient sedated: no    Patient tolerance: patient tolerated the procedure well with no immediate complications        Cytoscopy Procedure:     Procedure: Flexible cytoscope was passed per urethra into the bladder without difficulty after proper consent. The bladder was inspected in a systematic meridian fashion. There were no tumors, lesions, stones, or other abnormalities noted within the bladder. Of note, there was no increased vascularity as well. Both ureteral orifices were identified and were normal in appearance. The flexible cytoscope was removed. The patient tolerated the procedure well.     FOLLOW UP OFFICE NOTE: The CT scan of the Abdomen/Pelvis was with a small nonobstructing stone only.

## 2024-06-14 PROCEDURE — 87186 SC STD MICRODIL/AGAR DIL: CPT | Performed by: NURSE PRACTITIONER

## 2024-06-14 PROCEDURE — 87086 URINE CULTURE/COLONY COUNT: CPT | Performed by: NURSE PRACTITIONER

## 2024-06-18 ENCOUNTER — TELEPHONE (OUTPATIENT)
Dept: URGENT CARE | Facility: CLINIC | Age: 49
End: 2024-06-18
Payer: COMMERCIAL

## 2024-06-18 DIAGNOSIS — N39.0 ACUTE UTI: Primary | ICD-10-CM

## 2024-06-18 RX ORDER — LEVOFLOXACIN 500 MG/1
500 TABLET, FILM COATED ORAL DAILY
Qty: 7 TABLET | Refills: 0 | Status: SHIPPED | OUTPATIENT
Start: 2024-06-18 | End: 2024-06-25

## 2024-06-18 NOTE — TELEPHONE ENCOUNTER
Attempted to notify of abnormal urine culture result and need for antibiotic change. Patient did not answer but message was left to return my call. New antibiotic was sent to the pharmacy.

## 2024-07-09 DIAGNOSIS — E78.2 MIXED HYPERLIPIDEMIA: ICD-10-CM

## 2024-07-09 RX ORDER — ATORVASTATIN CALCIUM 40 MG/1
40 TABLET, FILM COATED ORAL DAILY
Qty: 90 TABLET | Refills: 1 | Status: SHIPPED | OUTPATIENT
Start: 2024-07-09

## 2024-09-20 DIAGNOSIS — K21.9 GASTROESOPHAGEAL REFLUX DISEASE WITHOUT ESOPHAGITIS: Primary | ICD-10-CM

## 2024-09-20 RX ORDER — FAMOTIDINE 20 MG/1
20 TABLET, FILM COATED ORAL NIGHTLY
Qty: 30 TABLET | Refills: 1 | Status: SHIPPED | OUTPATIENT
Start: 2024-09-20

## 2024-10-14 ENCOUNTER — HOSPITAL ENCOUNTER (OUTPATIENT)
Dept: MAMMOGRAPHY | Facility: HOSPITAL | Age: 49
Discharge: HOME | End: 2024-10-14
Admitting: STUDENT IN AN ORGANIZED HEALTH CARE EDUCATION/TRAINING PROGRAM
Payer: COMMERCIAL

## 2024-10-14 DIAGNOSIS — Z12.31 BREAST CANCER SCREENING BY MAMMOGRAM: ICD-10-CM

## 2024-10-14 PROCEDURE — 77067 SCR MAMMO BI INCL CAD: CPT

## 2024-10-14 PROCEDURE — 77063 BREAST TOMOSYNTHESIS BI: CPT

## 2024-10-15 NOTE — PROGRESS NOTES
Mammogram shows benign findings.    ?  This document has been electronically signed by Navneet Moreno MD on October 15, 2024 10:04 EDT

## 2024-12-03 ENCOUNTER — OFFICE VISIT (OUTPATIENT)
Dept: NEUROLOGY | Facility: CLINIC | Age: 49
End: 2024-12-03
Payer: COMMERCIAL

## 2024-12-03 ENCOUNTER — PATIENT ROUNDING (BHMG ONLY) (OUTPATIENT)
Dept: NEUROLOGY | Facility: CLINIC | Age: 49
End: 2024-12-03
Payer: COMMERCIAL

## 2024-12-03 VITALS
HEART RATE: 72 BPM | DIASTOLIC BLOOD PRESSURE: 81 MMHG | HEIGHT: 66 IN | SYSTOLIC BLOOD PRESSURE: 135 MMHG | BODY MASS INDEX: 31.37 KG/M2 | WEIGHT: 195.2 LBS

## 2024-12-03 DIAGNOSIS — G43.019 INTRACTABLE MIGRAINE WITHOUT AURA AND WITHOUT STATUS MIGRAINOSUS: Primary | ICD-10-CM

## 2024-12-03 RX ORDER — ATOGEPANT 60 MG/1
60 TABLET ORAL DAILY
Qty: 30 TABLET | Refills: 5 | Status: SHIPPED | OUTPATIENT
Start: 2024-12-03

## 2024-12-03 RX ORDER — RIMEGEPANT SULFATE 75 MG/75MG
75 TABLET, ORALLY DISINTEGRATING ORAL DAILY PRN
Qty: 8 TABLET | Refills: 5 | Status: SHIPPED | OUTPATIENT
Start: 2024-12-03

## 2024-12-03 NOTE — PROGRESS NOTES
"Chief Complaint  Migraine    Subjective          Tali Mcduffie presents to [facility]  History of Present Illness    History of Present Illness  The patient is a 49-year-old female who presents to the office for the initial evaluation of headaches.    She has been experiencing headaches for over two decades, initially diagnosed as tension headaches rather than migraines. The frequency of her headaches is approximately once or twice a week, totaling six to eight days per month. She reports that her headaches originate at the back of her head and radiate forward. Accompanying symptoms include occasional nausea. She recently returned from a two-week vacation during which she did not experience any headaches, but they have since resumed upon her return to work. Her occupation as a medical assistant involves partial computer use. She identifies poor sleep, weather changes, and the onset of her menstrual cycle as potential triggers for her headaches. She is currently in the premenopausal phase and notes that her headaches are more severe when she is tired in the morning or when the weather is cold. She has a history of cardiac issues. She has a history of arthritis in her neck. She has previously undergone an MRI, which yielded normal results, and has received occipital nerve blocks with steroids and lidocaine. She has also tried Topamax in the past. Her current management strategy involves taking Tylenol and consuming Monster caffeine, which she reports as being effective. However, she has taken two Tylenol today without any relief.    SOCIAL HISTORY  She works as a medical assistant.    MEDICATIONS  Current: Tylenol  Past: topiramate, metoprolol       Objective   Vital Signs:   /81   Pulse 72   Ht 167.6 cm (66\")   Wt 88.5 kg (195 lb 3.2 oz)   BMI 31.51 kg/m²     Physical Exam  HENT:      Head: Normocephalic.   Neck:      Comments: Occipital tenderness "   Pulmonary:      Effort: Pulmonary effort is normal.   Musculoskeletal:      Cervical back: Muscular tenderness present.   Neurological:      Mental Status: She is alert and oriented to person, place, and time.      Sensory: Sensation is intact.      Motor: Motor function is intact.      Coordination: Coordination is intact.      Deep Tendon Reflexes: Reflexes are normal and symmetric.      Neurological Exam  Mental Status  Alert. Oriented to person, place, and time.    Sensory  Normal sensation.    Reflexes  Deep tendon reflexes are 2+ and symmetric in all four extremities.    Coordination    Finger-to-nose, rapid alternating movements and heel-to-shin normal bilaterally without dysmetria.      Result Review :               Results for orders placed or performed during the hospital encounter of 04/06/23   CT Head Without Contrast    Narrative    PROCEDURE: CT HEAD WO CONTRAST     COMPARISON:  None     INDICATIONS: headache     PROTOCOL:   Standard imaging protocol performed      RADIATION:   DLP: 954.5mGy*cm    MA and/or KV was adjusted to minimize radiation dose.       TECHNIQUE: CT images were obtained without non-ionic intravenous contrast material.      FINDINGS:   The ventricles are normal in size, position, and configuration.  Sulci are not abnormally   prominent.     No abnormal gray or white matter density is appreciated.     There is no CT evidence of acute intracranial hemorrhage, mass, or mass effect.     The orbits have a normal appearance.     The paranasal sinuses, middle ears, and mastoid air cells are well aerated.       Impression     Negative CT scan of the head without IV contrast.            CHAPINCITO LAURENT MD         Electronically Signed and Approved By: CHAPINCITO LAURENT MD on 4/06/2023 at 10:15                        Assessment and Plan    Diagnoses and all orders for this visit:    1. Intractable migraine without aura and without status migrainosus (Primary)    Other orders  -     Atogepant  (Qulipta) 60 MG tablet; Take 1 tablet by mouth Daily.  Dispense: 30 tablet; Refill: 5  -     Rimegepant Sulfate (Nurtec) 75 MG tablet dispersible tablet; Take 1 tablet by mouth Daily As Needed (migraine).  Dispense: 8 tablet; Refill: 5        Assessment & Plan  1. Migraine headaches.  Her symptoms align with the diagnostic criteria for migraines, including occipital onset, weather and hormonal triggers, and associated nausea. Given her cardiac history, medications from the triptan class will be avoided. A preventative treatment regimen will be initiated, consisting of Qulipta 1 tablet daily. Additionally, Nurtec 1 tablet to be dissolved under the tongue as needed, but not exceeding 1 tablet per day, will be prescribed. Co-pay cards for these medications have been provided, and she is instructed to activate them and provide the numbers to the pharmacy. Both medications require prior authorization, but the first month's supply of the preventative medication should be covered by the card. If she encounters any difficulties, she is advised to contact us. If the current treatment plan proves ineffective, the addition of a muscle relaxer may be considered.    Follow-up  The patient will follow up in 4 months.    PROCEDURE  The patient has previously received occipital nerve blocks with steroids and lidocaine.         Follow Up   Return in about 4 months (around 4/3/2025) for Migraine f/u.  Patient was given instructions and counseling regarding her condition or for health maintenance advice. Please see specific information pulled into the AVS if appropriate.       Patient or patient representative verbalized consent for the use of Ambient Listening during the visit with  ADITYA Mcnally for chart documentation. 12/5/2024  14:25 EST

## 2024-12-06 ENCOUNTER — PRIOR AUTHORIZATION (OUTPATIENT)
Dept: NEUROLOGY | Facility: CLINIC | Age: 49
End: 2024-12-06
Payer: COMMERCIAL

## 2024-12-30 ENCOUNTER — OFFICE VISIT (OUTPATIENT)
Dept: FAMILY MEDICINE CLINIC | Facility: CLINIC | Age: 49
End: 2024-12-30
Payer: COMMERCIAL

## 2024-12-30 VITALS
HEIGHT: 66 IN | OXYGEN SATURATION: 99 % | BODY MASS INDEX: 31.02 KG/M2 | HEART RATE: 72 BPM | DIASTOLIC BLOOD PRESSURE: 72 MMHG | SYSTOLIC BLOOD PRESSURE: 120 MMHG | WEIGHT: 193 LBS

## 2024-12-30 DIAGNOSIS — Z00.00 ANNUAL PHYSICAL EXAM: Primary | ICD-10-CM

## 2024-12-30 DIAGNOSIS — R73.03 PREDIABETES: ICD-10-CM

## 2024-12-30 LAB
EXPIRATION DATE: ABNORMAL
HBA1C MFR BLD: 6 % (ref 4.5–5.7)
Lab: ABNORMAL

## 2024-12-30 PROCEDURE — 83036 HEMOGLOBIN GLYCOSYLATED A1C: CPT | Performed by: STUDENT IN AN ORGANIZED HEALTH CARE EDUCATION/TRAINING PROGRAM

## 2024-12-30 PROCEDURE — 99213 OFFICE O/P EST LOW 20 MIN: CPT | Performed by: STUDENT IN AN ORGANIZED HEALTH CARE EDUCATION/TRAINING PROGRAM

## 2024-12-30 NOTE — PROGRESS NOTES
Subjective:       Tali Mcduffie is a 49 y.o. female with a concurrent medical history of hyperlipidemia, prediabetes, migraines, and gastroesophageal reflux disease  who presents for annual physical.    In terms of hyperlipidemia, lipid panel improved on statin.    In terms of prediabetes, A1C has increased to 6.       We discussed potentially treating with metformin or GLP-1. She expresses preference to continue with lifestyle dietary modifications first.     Migraines are practically cured with the help of Neurology. We are grateful to their office for seeing her.     GERD is well controlled.       Past Medical Hx:  Past Medical History:   Diagnosis Date    Cataract     Cholelithiasis 2000    GERD (gastroesophageal reflux disease)     Glaucoma     Hyperlipidemia     Hypertension     Kidney stone     Premature atrial complexes 10/16/2023       Past Surgical Hx:  Past Surgical History:   Procedure Laterality Date    APPENDECTOMY      CHOLECYSTECTOMY      COLONOSCOPY      COLONOSCOPY N/A 3/27/2024    Procedure: COLONOSCOPY with biopsy;  Surgeon: Vinny Lyn MD;  Location: [facility] ENDOSCOPY;  Service: General;  Laterality: N/A;  colon polyp    TONSILLECTOMY      WISDOM TOOTH EXTRACTION Bilateral        Current Meds:    Current Outpatient Medications:     Atogepant (Qulipta) 60 MG tablet, Take 1 tablet by mouth Daily., Disp: 30 tablet, Rfl: 5    atorvastatin (Lipitor) 40 MG tablet, Take 1 tablet by mouth Daily., Disp: 90 tablet, Rfl: 1    famotidine (Pepcid) 20 MG tablet, Take 1 tablet by mouth Every Night. (Patient taking differently: Take 1 tablet by mouth Daily As Needed.), Disp: 30 tablet, Rfl: 1    metoprolol succinate XL (TOPROL-XL) 25 MG 24 hr tablet, Take 1 tablet by mouth Daily., Disp: 30 tablet, Rfl: 11    omeprazole (priLOSEC) 20 MG capsule, Take 1 capsule by mouth Daily., Disp: 30 capsule, Rfl: 3    Rimegepant Sulfate (Nurtec) 75 MG tablet dispersible tablet, Take 1 tablet by mouth Daily As  "Needed (migraine)., Disp: 8 tablet, Rfl: 5    Allergies:  Allergies   Allergen Reactions    Penicillins Hives and Rash       Family Hx:  Family History   Problem Relation Age of Onset    Diabetes Mother     Heart disease Father     Other Sister         Social History:  Social History     Socioeconomic History    Marital status: Single   Tobacco Use    Smoking status: Never    Smokeless tobacco: Never   Vaping Use    Vaping status: Never Used   Substance and Sexual Activity    Alcohol use: Not Currently     Comment: social    Drug use: Never    Sexual activity: Not Currently     Partners: Male     Birth control/protection: None       Review of Systems  Review of Systems   Constitutional:  Negative for chills, diaphoresis, fatigue and fever.   HENT:  Negative for congestion and sore throat.    Respiratory:  Negative for cough.    Cardiovascular:  Negative for chest pain.   Gastrointestinal:  Negative for abdominal pain, nausea and vomiting.   Genitourinary:  Negative for dysuria.   Musculoskeletal:  Negative for myalgias and neck pain.   Skin:  Negative for rash.   Neurological:  Negative for weakness, numbness and headaches.       Objective:     /72 (BP Location: Right arm, Patient Position: Sitting, Cuff Size: Large Adult)   Pulse 72   Ht 167.6 cm (66\")   Wt 87.5 kg (193 lb)   SpO2 99%   BMI 31.15 kg/m²   Physical Exam  Constitutional:       General: She is not in acute distress.     Appearance: Normal appearance. She is obese. She is not ill-appearing, toxic-appearing or diaphoretic.   Cardiovascular:      Rate and Rhythm: Normal rate and regular rhythm.   Pulmonary:      Effort: Pulmonary effort is normal.      Breath sounds: Normal breath sounds.   Neurological:      Mental Status: She is alert.   Psychiatric:         Mood and Affect: Mood normal.         Behavior: Behavior normal.          Assessment/Plan:     Diagnoses and all orders for this visit:    1. Annual physical exam (Primary)    2. " Prediabetes    In terms of prediabetes, A1C has increased to 6.       We discussed potentially treating with metformin or GLP-1. She expresses preference to continue with lifestyle dietary modifications first.       -     POC Glycosylated Hemoglobin (Hb A1C)                      Follow-up:     Return in about 1 year (around 12/30/2025) for Annual physical.    Preventative:  Health Maintenance   Topic Date Due    ANNUAL PHYSICAL  Never done    ZOSTER VACCINE (1 of 2) Never done    COVID-19 Vaccine (1 - 2024-25 season) Never done    BMI FOLLOWUP  02/19/2025    LIPID PANEL  05/21/2025    TDAP/TD VACCINES (2 - Td or Tdap) 05/09/2026    MAMMOGRAM  10/14/2026    PAP SMEAR  11/28/2026    COLORECTAL CANCER SCREENING  03/27/2029    HEPATITIS C SCREENING  Completed    INFLUENZA VACCINE  Completed    Pneumococcal Vaccine 0-64  Aged Out           This document has been electronically signed by Navneet Moreno MD on December 30, 2024 19:04 EST       Parts of this note are electronic transcriptions/translations of spoken language to printed text using the Dragon Dictation system.

## 2025-01-14 DIAGNOSIS — E78.2 MIXED HYPERLIPIDEMIA: ICD-10-CM

## 2025-01-16 RX ORDER — ATORVASTATIN CALCIUM 40 MG/1
40 TABLET, FILM COATED ORAL DAILY
Qty: 90 TABLET | Refills: 1 | Status: SHIPPED | OUTPATIENT
Start: 2025-01-16

## 2025-01-20 RX ORDER — METOPROLOL SUCCINATE 25 MG/1
25 TABLET, EXTENDED RELEASE ORAL DAILY
Qty: 30 TABLET | Refills: 11 | Status: SHIPPED | OUTPATIENT
Start: 2025-01-20

## 2025-01-21 ENCOUNTER — TELEPHONE (OUTPATIENT)
Dept: NEUROLOGY | Facility: CLINIC | Age: 50
End: 2025-01-21
Payer: COMMERCIAL

## 2025-01-21 ENCOUNTER — SPECIALTY PHARMACY (OUTPATIENT)
Dept: PHARMACY | Facility: TELEHEALTH | Age: 50
End: 2025-01-21
Payer: COMMERCIAL

## 2025-01-21 NOTE — TELEPHONE ENCOUNTER
Pharmacy Name:  [facility]    Reference Number (if applicable):     Pharmacy representative name: PASTOR    Pharmacy representative phone number:   [phone]    What medication are you calling in regards to:   NURTEC    What question does the pharmacy have:   IN ORDER FOR INSURANCE TO COVER RX, PHARMACY IS ASKING TO SWITCH FROM NURTEC TO UBRELVY    Who is the provider that prescribed the medication:   GAUDENCIO ROBERTS    Additional notes:   PLEASE CALL PHARMACY BACK TO DISCUSS THIS RX.

## 2025-01-27 ENCOUNTER — SPECIALTY PHARMACY (OUTPATIENT)
Dept: PHARMACY | Facility: TELEHEALTH | Age: 50
End: 2025-01-27
Payer: COMMERCIAL

## 2025-01-27 NOTE — TELEPHONE ENCOUNTER
Called to check status. Per Charisse at [facility] ([phone]) PA is in final stages of review and should be determined by end of day.    Denial letter received - plan prefers Ubrelvy

## 2025-01-29 ENCOUNTER — SPECIALTY PHARMACY (OUTPATIENT)
Dept: PHARMACY | Facility: TELEHEALTH | Age: 50
End: 2025-01-29
Payer: COMMERCIAL

## 2025-02-03 ENCOUNTER — SPECIALTY PHARMACY (OUTPATIENT)
Dept: PHARMACY | Facility: TELEHEALTH | Age: 50
End: 2025-02-03
Payer: COMMERCIAL

## 2025-02-03 NOTE — PROGRESS NOTES
Specialty Pharmacy Patient Management Program  Initial Assessment     Tali Mcduffie is a 49 y.o. female with chronic migraine and enrolled in the Patient Management program offered by [facility]. An initial outreach was conducted, including assessment of therapy appropriateness and specialty medication education for Ubrelvy 100 mg tablet and Qulipta 60 mg tablet. The patient was introduced to services offered by [facility], including: regular assessments, refill coordination, curbside pick-up or mail order delivery options, prior authorization maintenance, and financial assistance programs as applicable. The patient was also provided with contact information for the pharmacy team.     Insurance Coverage & Financial Support  Affirmed and copay card      Relevant Past Medical History and Comorbidities  Relevant medical history and concomitant health conditions were discussed with the patient. The patient's chart has been reviewed for relevant past medical history and comorbid health conditions and updated as necessary.   Past Medical History:   Diagnosis Date    Cataract     Cholelithiasis 2000    GERD (gastroesophageal reflux disease)     Glaucoma     Hyperlipidemia     Hypertension     Kidney stone     Premature atrial complexes 10/16/2023     Social History     Socioeconomic History    Marital status: Single   Tobacco Use    Smoking status: Never    Smokeless tobacco: Never   Vaping Use    Vaping status: Never Used   Substance and Sexual Activity    Alcohol use: Not Currently     Comment: social    Drug use: Never    Sexual activity: Not Currently     Partners: Male     Birth control/protection: None     Problem list reviewed by Mercedes Cates, PharmD on 2/3/2025 at 10:08 AM    Allergies  Known allergies and reactions were discussed with the patient. The patient's chart has been reviewed for allergy information and updated as necessary.    Penicillins  Allergies reviewed by Mercedes Cates, PharmD on 2/3/2025 at 10:08 AM    Current Medication List  This medication list has been reviewed with the patient and evaluated for any interactions or necessary modifications/recommendations, and updated to include all prescription medications, OTC medications, and supplements the patient is currently taking. This list reflects what is contained in the patient's profile, which has also been marked as reviewed to communicate to other providers it is the most up to date version of the patient's current medication therapy.     Current Outpatient Medications:     Atogepant (Qulipta) 60 MG tablet, Take 1 tablet by mouth Daily., Disp: 30 tablet, Rfl: 5    atorvastatin (Lipitor) 40 MG tablet, Take 1 tablet by mouth Daily., Disp: 90 tablet, Rfl: 1    famotidine (Pepcid) 20 MG tablet, Take 1 tablet by mouth Every Night. (Patient taking differently: Take 1 tablet by mouth Daily As Needed.), Disp: 30 tablet, Rfl: 1    metoprolol succinate XL (TOPROL-XL) 25 MG 24 hr tablet, Take 1 tablet by mouth Daily., Disp: 30 tablet, Rfl: 11    omeprazole (priLOSEC) 20 MG capsule, Take 1 capsule by mouth Daily., Disp: 30 capsule, Rfl: 3    ubrogepant (Ubrelvy) 100 MG tablet, Take 1 tablet by mouth 1 (One) Time As Needed for migraine, may repeat once in 2 hours if needed. max 200 mg per 24 hours, Disp: 10 tablet, Rfl: 5  Medicines reviewed by Mercedes Cates, PharmD on 2/3/2025 at 10:08 AM    Drug Interactions  none     Relevant Laboratory Values  Lab Results   Component Value Date    GLUCOSE 103 (H) 05/21/2024    CALCIUM 9.3 05/21/2024     05/21/2024    K 4.0 05/21/2024    CO2 22.7 05/21/2024     05/21/2024    BUN 13 05/21/2024    CREATININE 0.73 05/21/2024    BCR 17.8 05/21/2024    ANIONGAP 11.3 05/21/2024     Lab Results   Component Value Date    WBC 6.37 05/21/2024    HGB 12.7 05/21/2024    HCT 38.6 05/21/2024    MCV 84.5 05/21/2024     05/21/2024     Lab Value  Review  The above lab values have been reviewed; the following specialty medication(s) dose adjustment(s) are recommended: none.    Initial Education Provided for Specialty Medication  The patient has been provided with the following education and any applicable administration techniques (i.e. self-injection) have been demonstrated for the therapies indicated. All questions and concerns have been addressed prior to the patient receiving the medication, and the patient has verbalized understanding of the education and any materials provided. Additional patient education shall be provided and documented upon request by the patient, provider or payer.      Ubrelvy (Ubrogepant)  Medication Expectations   Why am I taking this medication? You are taking this medication to treat acute migraines.   What should I expect while on this medication? You should expect to see a decrease in the frequency and severity of your migraines.   How does the medication work? Ubrelvy is a monoclonal antibody that binds to calcitonin gene-related peptide (CGRP) and blocks its binding to the receptor decreasing the severity of migraines.   How long will I be on this medication for? The amount of time you will be on this medication will be determined by your doctor and your response to the medication.    How do I take this medication? Take as directed on your prescription label.  Reviewed plan for Ubrelvy 100mg (1 tablet) PO daily prn; may repeat x 1 in 2 hours, if needed. Max dosage = 200mg/24 hours.    What are some possible side effects? Potential side effects including, but not limited to nausea and somnolence. Pt verbalized understanding.   What happens if I miss a dose? This is taken as needed for migraines.     Medication Safety   What are things I should warn my doctor immediately about? Allergic reaction: Itching or hives, swelling in your face or hands, swelling or tingling in your mouth or throat, chest tightness, trouble  breathing     What are things that I should be cautious of? Tell your doctor if you are pregnant or breastfeeding, or if you have kidney disease or liver disease.   What are some medications that can interact with this one? Concomitant use of strong CY inhibitors, check with your pharmacist or provider before starting any new medications, avoid grapefruit juice.     Medication Storage/Handling   How should I handle this medication? Keep this medication out of reach of pets/children.   How does this medication need to be stored? Store at a controlled room temperature between 20 and 25 degrees C (68 and 77 degrees F), with excursions permitted between 15 and 30 degrees C (59 and 86 degrees F) away from direct sunlight and moisture.   How should I dispose of this medication? There should not be a need to dispose of this medication unless your provider decides to change the dose or therapy. If that is the case, take to your local police station for proper disposal. Some pharmacies also have take-back bins for medication drop-off.      Resources/Support   How can I remind myself to take this medication? This is taken as needed for migraines.   Is financial support available?  Yes, [employer] can provide co-pay cards if you have commercial insurance or patient assistance if you have Medicare or no insurance.    Which vaccines are recommended for me? Talk to your doctor about these vaccines: Flu, Coronavirus (COVID-19), Pneumococcal (pneumonia), Tdap, Hepatitis B, Zoster (shingles)     Atogepant (Qulipta)        Medication Expectations   Why am I taking this medication? You are taking this medication for migraine prophylaxis.   What should I expect while on this medication? You should expect to a decrease in the frequency and severity of your migraines.   How does the medication work? Qulipta is a monoclonal antibody that binds to calcitonin gene-related peptide (CGRP) and blocks its binding to the  receptor decreasing the severity of migraines.   How long will I be on this medication for? The amount of time you will be on this medication will be determined by your doctor and your response to the medication.    How do I take this medication? Take as directed on your prescription label.   Take one tablet daily with or without food.   What are some possible side effects? Potential side effects including, but not limited to nausea, constipation and fatigue. Pt verbalized understanding.   What happens if I miss a dose? If you miss a dose of this medicine, take it as soon as possible. However, if it is almost time for your next dose, skip the missed dose and go back to your regular dosing schedule. Do not double doses.                  Medication Safety   What are things I should warn my doctor immediately about? Hypersensitivity reactions, such as trouble breathing or swallowing.   What are things that I should be cautious of? Be cautious driving until you know how this drug will affect you.   What are some medications that can interact with this one? Ketoconazole, Itraconazole, cyclosporin, clarithromycin, rifampin, carbamazepine, phenytion, [place]'s Wort, efavirenz, and etravine.            Medication Storage/Handling   How should I handle this medication? Keep this medication our of reach of pets/children in original container.   How does this medication need to be stored? Store at room temperature away from heat/cold, sunlight or moisture.   How should I dispose of this medication? There should not be a need to dispose of this medication unless your provider decides to change the dose or therapy. If that is the case, take to your local police station for proper disposal. Some pharmacies also have take-back bins for medication drop-off.             Resources/Support   How can I remind myself to take this medication? You can download reminder apps to help you manage your refills. You may also set an alarm on  your phone to remind you. The pharmacy carries pill boxes that you can place next to an area you pass everyday (such as where you place your car keys or where you charge your phone)   Is financial support available?  Yes, [employer] can provide co-pay cards if you have commercial insurance or patient assistance if you have Medicare or no insurance.    Which vaccines are recommended for me? Talk to your doctor about these vaccines: Flu, Coronavirus (COVID-19), Pneumococcal (pneumonia), Tdap, Hepatitis B, Zoster (shingles)          Adherence, Self-Administration, and Current Therapy Problems  Adherence related to the patient's specialty therapy was discussed with the patient. The Adherence segment of this outreach has been reviewed and updated.          Additional Barriers to Patient Self-Administration: None  Methods for Supporting Patient Self-Administration: N/A    Open Medication Therapy Problems  No medication therapy recommendations to display    Goals of Therapy   Goals Addressed Today        Specialty Pharmacy General Goal      Decrease frequency, severity and duration of migraine attacks by 50%              Reassessment Plan & Follow-Up  Medication Therapy Changes: First fill managed by call center. Patient to continue Qulipta for prevention and switching from Nurtec to Ubrelvy for acute treatment.  Additional Plans, Therapy Recommendations, or Therapy Problems to Be Addressed: none   Pharmacist to perform regular reassessments no more than (6) months from the previous assessment.  Welcome information and patient satisfaction survey to be sent by retail team with patient's initial fill.  Care Coordinator to set up future refill outreaches, coordinate prescription delivery, and escalate clinical questions to pharmacist.     Attestation  I attest the patient was actively involved in and has agreed to the above plan of care. I attest that the initiated specialty medication(s) are appropriate for the patient based on  my assessment. If the prescribed therapy is at any point deemed not appropriate based on the current or future assessments, a consultation will be initiated with the patient's specialty care provider to determine the best course of action. The revised plan of therapy will be documented along with any reassessments and/or additional patient education provided.     Electronically signed by Mercedes Cates PharmD, 02/03/25, 10:09 AM EST.

## 2025-03-05 ENCOUNTER — SPECIALTY PHARMACY (OUTPATIENT)
Dept: PHARMACY | Facility: TELEHEALTH | Age: 50
End: 2025-03-05
Payer: COMMERCIAL

## 2025-03-05 NOTE — PROGRESS NOTES
Specialty Pharmacy Patient Management Program  Refill Outreach     Tali was contacted today regarding refills of their medication(s).    Refill Questions      Flowsheet Row Most Recent Value   Changes to allergies? No   Changes to medications? No   New conditions or infections since last clinic visit No   Unplanned office visit, urgent care, ED, or hospital admission in the last 4 weeks  No   How does patient/caregiver feel medication is working? Good   Financial problems or insurance changes  No   Since the previous refill, were any specialty medication doses or scheduled injections missed or delayed?  No   Does this patient require a clinical escalation to a pharmacist? No            Delivery Questions      Flowsheet Row Most Recent Value   Delivery method UPS   Delivery address verified with patient/caregiver? Yes   Delivery address Home   Other address preferred n/a   Number of medications in delivery 1   Medication(s) being filled and delivered Atogepant (Qulipta)   Doses left of specialty medications a few   Copay verified? Yes   Copay amount $0   Copay form of payment No copayment ($0)   Delivery Date Selection 03/11/25   Signature Required No                 Follow-up: 21 day(s)     Tsering Elizalde, Pharmacy Technician  3/5/2025  12:37 EST

## 2025-03-06 NOTE — PROGRESS NOTES
[facility]  Cardiology progress Note    Patient Name: Tali Mcduffie  : 1975    CHIEF COMPLAINT  Palpitation        Subjective   Subjective     HISTORY OF PRESENT ILLNESS    Tali Mcduffie is a 49 y.o. female with history of palpitations.  Has palpitations on and off    REVIEW OF SYSTEMS    Constitutional:    No fever, no weight loss  Skin:     No rash  Otolaryngeal:    No difficulty swallowing  Cardiovascular: See HPI.  Pulmonary:    No cough, no sputum production    Personal History     Social History:    reports that she has never smoked. She has never used smokeless tobacco. She reports that she does not currently use alcohol. She reports that she does not use drugs.    Home Medications:  Current Outpatient Medications on File Prior to Visit   Medication Sig    Atogepant (Qulipta) 60 MG tablet Take 1 tablet by mouth Daily.    atorvastatin (Lipitor) 40 MG tablet Take 1 tablet by mouth Daily.    famotidine (Pepcid) 20 MG tablet Take 1 tablet by mouth Every Night.    metoprolol succinate XL (TOPROL-XL) 25 MG 24 hr tablet Take 1 tablet by mouth Daily.    omeprazole (priLOSEC) 20 MG capsule Take 1 capsule by mouth Daily.    ubrogepant (Ubrelvy) 100 MG tablet Take 1 tablet by mouth 1 (One) Time As Needed for migraine, may repeat once in 2 hours if needed. max 200 mg per 24 hours     No current facility-administered medications on file prior to visit.       Past Medical History:   Diagnosis Date    Cataract     Cholelithiasis     GERD (gastroesophageal reflux disease)     Glaucoma     Hyperlipidemia     Hypertension     Kidney stone     Premature atrial complexes 10/16/2023       Allergies:  Allergies   Allergen Reactions    Penicillins Hives and Rash       Objective    Objective       Vitals:   Heart Rate:  [82] 82  BP: (132)/(93) 132/93  Body mass index is 30.02 kg/m².     PHYSICAL EXAM:    General Appearance:   well developed  well nourished  HENT:   oropharynx moist  lips not  cyanotic  Neck:  thyroid not enlarged  supple  Respiratory:  no respiratory distress  normal breath sounds  no rales  Cardiovascular:  no jugular venous distention  regular rhythm  apical impulse normal  S1 normal, S2 normal  no S3, no S4   no murmur  no rub, no thrill  carotid pulses normal; no bruit  pedal pulses normal  lower extremity edema: none    Skin:   warm, dry  Psychiatric:  judgement and insight appropriate  normal mood and affect        Result Review:  I have personally reviewed the available results from  [x]  Laboratory  [x]  EKG  [x]  Cardiology  [x]  Medications  [x]  Old records  []  Other:     Procedures  Lab Results   Component Value Date    CHOL 176 05/21/2024    CHOL 300 (H) 10/16/2023     Lab Results   Component Value Date    TRIG 210 (H) 05/21/2024    TRIG 183 (H) 10/16/2023     Lab Results   Component Value Date    HDL 48 05/21/2024    HDL 58 10/16/2023     Lab Results   Component Value Date    LDL 92 05/21/2024     (H) 10/16/2023     Lab Results   Component Value Date    VLDL 36 05/21/2024    VLDL 35 10/16/2023     Results for orders placed in visit on 11/22/23    Adult Transthoracic Echo Complete W/ Cont if Necessary Per Protocol    Interpretation Summary  Normal left-ventricular systolic function.  No significant valve abnormalities noted.     Impression/Plan:  1. Palpitations: Increase Toprol-XL to 25 mg twice daily.  48-hour Holter monitoring.  Low caffeine diet advised.  2.  Mixed hyperlipidemia: Low-fat diet and exercise.  Continue Lipitor 40 mg once a day.  Monitor lipid and hepatic profile.                 Baltazar Preciado MD   03/11/25   08:53 EDT

## 2025-03-07 DIAGNOSIS — K21.9 GASTROESOPHAGEAL REFLUX DISEASE WITHOUT ESOPHAGITIS: ICD-10-CM

## 2025-03-07 RX ORDER — FAMOTIDINE 20 MG/1
20 TABLET, FILM COATED ORAL NIGHTLY
Qty: 90 TABLET | Refills: 0 | Status: SHIPPED | OUTPATIENT
Start: 2025-03-07

## 2025-03-10 ENCOUNTER — SPECIALTY PHARMACY (OUTPATIENT)
Dept: PHARMACY | Facility: TELEHEALTH | Age: 50
End: 2025-03-10
Payer: COMMERCIAL

## 2025-03-11 ENCOUNTER — OFFICE VISIT (OUTPATIENT)
Dept: CARDIOLOGY | Facility: CLINIC | Age: 50
End: 2025-03-11
Payer: COMMERCIAL

## 2025-03-11 VITALS
HEIGHT: 66 IN | BODY MASS INDEX: 29.89 KG/M2 | SYSTOLIC BLOOD PRESSURE: 132 MMHG | WEIGHT: 186 LBS | HEART RATE: 82 BPM | DIASTOLIC BLOOD PRESSURE: 93 MMHG

## 2025-03-11 DIAGNOSIS — R00.2 PALPITATIONS: Primary | ICD-10-CM

## 2025-03-11 DIAGNOSIS — E78.2 HYPERLIPEMIA, MIXED: ICD-10-CM

## 2025-03-11 PROCEDURE — 99214 OFFICE O/P EST MOD 30 MIN: CPT | Performed by: SPECIALIST

## 2025-03-11 RX ORDER — METOPROLOL SUCCINATE 25 MG/1
25 TABLET, EXTENDED RELEASE ORAL 2 TIMES DAILY
Qty: 60 TABLET | Refills: 11 | Status: SHIPPED | OUTPATIENT
Start: 2025-03-11

## 2025-03-24 ENCOUNTER — RESULTS FOLLOW-UP (OUTPATIENT)
Dept: CARDIOLOGY | Facility: CLINIC | Age: 50
End: 2025-03-24
Payer: COMMERCIAL

## 2025-03-25 ENCOUNTER — TELEPHONE (OUTPATIENT)
Dept: CARDIOLOGY | Facility: CLINIC | Age: 50
End: 2025-03-25
Payer: COMMERCIAL

## 2025-04-09 ENCOUNTER — OFFICE VISIT (OUTPATIENT)
Dept: NEUROLOGY | Facility: CLINIC | Age: 50
End: 2025-04-09
Payer: COMMERCIAL

## 2025-04-09 VITALS
DIASTOLIC BLOOD PRESSURE: 75 MMHG | WEIGHT: 188 LBS | SYSTOLIC BLOOD PRESSURE: 120 MMHG | BODY MASS INDEX: 30.22 KG/M2 | HEIGHT: 66 IN | HEART RATE: 73 BPM

## 2025-04-09 DIAGNOSIS — G43.019 INTRACTABLE MIGRAINE WITHOUT AURA AND WITHOUT STATUS MIGRAINOSUS: Primary | ICD-10-CM

## 2025-04-09 RX ORDER — ATOGEPANT 60 MG/1
60 TABLET ORAL DAILY
Qty: 30 TABLET | Refills: 5 | Status: SHIPPED | OUTPATIENT
Start: 2025-04-09

## 2025-04-09 RX ORDER — MAGNESIUM OXIDE 400 MG/1
400 TABLET ORAL DAILY
COMMUNITY

## 2025-04-11 NOTE — PROGRESS NOTES
"Chief Complaint  Migraine    Subjective          Tali Mcduffie presents to [facility]  History of Present Illness    History of Present Illness  The patient is a 49-year-old female who presents to the office for a follow-up on migraines. She remains on Qulipta for preventative therapy and Ubrelvy as needed.    A significant improvement in migraine symptoms is reported with the use of Qulipta, noting a decrease in both the frequency and intensity of headaches. The use of Ubrelvy has not been required, indicating a reduction in the severity of migraines. Initially, mild nausea and fatigue were experienced with Qulipta, but these side effects have since resolved. However, constipation persists and is managed with stool softeners as needed.    INTERVAL: Since the last visit, there has been a notable improvement in migraine symptoms with a reduction in frequency and intensity. The patient has not needed to use Ubrelvy. Initial side effects of nausea and fatigue from Qulipta have resolved, but constipation remains an issue, managed with stool softeners.    MEDICATIONS  CURRENT MEDS:  Qulipta  Ubrelvy As needed       Objective   Vital Signs:   /75   Pulse 73   Ht 167.6 cm (66\")   Wt 85.3 kg (188 lb)   BMI 30.34 kg/m²     Physical Exam  HENT:      Head: Normocephalic.   Pulmonary:      Effort: Pulmonary effort is normal.   Neurological:      Mental Status: She is alert and oriented to person, place, and time.      Sensory: Sensation is intact.      Motor: Motor function is intact.      Coordination: Coordination is intact.      Deep Tendon Reflexes: Reflexes are normal and symmetric.        Neurological Exam  Mental Status  Alert. Oriented to person, place, and time.    Sensory  Normal sensation.    Reflexes  Deep tendon reflexes are 2+ and symmetric in all four extremities.    Coordination    Finger-to-nose, rapid alternating movements and heel-to-shin normal " bilaterally without dysmetria.      Result Review :   CBC:  Lab Results   Component Value Date    WBC 6.37 05/21/2024    RBC 4.57 05/21/2024    HGB 12.7 05/21/2024    HCT 38.6 05/21/2024    MCV 84.5 05/21/2024    MCH 27.8 05/21/2024    MCHC 32.9 05/21/2024    RDW 14.8 05/21/2024     05/21/2024     CMP:  Lab Results   Component Value Date    BUN 13 05/21/2024    CREATININE 0.73 05/21/2024     05/21/2024    K 4.0 05/21/2024     05/21/2024    CALCIUM 9.3 05/21/2024    ALBUMIN 4.0 05/21/2024    BILITOT 0.2 05/21/2024    ALKPHOS 83 05/21/2024    AST 19 05/21/2024    ALT 28 05/21/2024     TSH/FREE T4:   Lab Results   Component Value Date    TSH 2.210 10/16/2023    FREET4 0.99 10/16/2023                Assessment and Plan    There are no diagnoses linked to this encounter.    Assessment & Plan  1. Migraine.  Her condition is showing improvement with the current treatment regimen of Qulipta for preventative therapy and Ubrelvy as needed. She reports a decrease in the frequency and intensity of headaches and has not needed to use Ubrelvy. The initial side effects of nausea and tiredness have resolved, but she continues to experience constipation. She is advised to continue using stool softeners like Colace on a regular basis to manage this side effect. It is noted that the level of constipation typically does not worsen with continued use of Qulipta. She will continue with her current medications, Qulipta and Ubrelvy. If her condition worsens or if she requires additional assistance, she is encouraged to contact the office.    Follow-up  The patient will follow up in 6 months.         Follow Up   Return in about 6 months (around 10/9/2025) for Migraine f/u.  Patient was given instructions and counseling regarding her condition or for health maintenance advice. Please see specific information pulled into the AVS if appropriate.       Patient or patient representative verbalized consent for the use of  Ambient Listening during the visit with  ADITYA Mcnally for chart documentation. 4/11/2025  13:48 EDT

## 2025-05-07 ENCOUNTER — SPECIALTY PHARMACY (OUTPATIENT)
Dept: PHARMACY | Facility: TELEHEALTH | Age: 50
End: 2025-05-07
Payer: COMMERCIAL

## 2025-05-07 NOTE — PROGRESS NOTES
Specialty Pharmacy Patient Management Program  Refill Outreach     Tali was contacted today regarding refills of their medication(s).    Refill Questions      Flowsheet Row Most Recent Value   Changes to allergies? No   Changes to medications? No   New conditions or infections since last clinic visit No   Unplanned office visit, urgent care, ED, or hospital admission in the last 4 weeks  No   How does patient/caregiver feel medication is working? Good   Financial problems or insurance changes  No   Since the previous refill, were any specialty medication doses or scheduled injections missed or delayed?  No   Does this patient require a clinical escalation to a pharmacist? No            Delivery Questions      Flowsheet Row Most Recent Value   Delivery method [employer]   Delivery address verified with patient/caregiver? Yes   Delivery address Home   Other address preferred n/a   Number of medications in delivery 1   Medication(s) being filled and delivered Atogepant (Qulipta)   Doses left of specialty medications 1 week   Copay verified? Yes   Copay amount $0   Copay form of payment No copayment ($0)   Delivery Date Selection 05/08/25   Signature Required No   Do you consent to receive electronic handouts?  No                 Follow-up: 21 day(s)     Tsering Elizalde, Pharmacy Technician  5/7/2025  10:31 EDT

## 2025-05-27 ENCOUNTER — PRIOR AUTHORIZATION (OUTPATIENT)
Dept: NEUROLOGY | Facility: CLINIC | Age: 50
End: 2025-05-27
Payer: COMMERCIAL

## 2025-06-10 DIAGNOSIS — R11.0 NAUSEA: Primary | ICD-10-CM

## 2025-06-10 RX ORDER — ONDANSETRON 4 MG/1
4 TABLET, FILM COATED ORAL EVERY 8 HOURS PRN
Qty: 14 TABLET | Refills: 0 | Status: SHIPPED | OUTPATIENT
Start: 2025-06-10

## 2025-06-20 DIAGNOSIS — E78.2 MIXED HYPERLIPIDEMIA: ICD-10-CM

## 2025-06-20 RX ORDER — ATORVASTATIN CALCIUM 40 MG/1
40 TABLET, FILM COATED ORAL DAILY
Qty: 90 TABLET | Refills: 1 | Status: SHIPPED | OUTPATIENT
Start: 2025-06-20

## 2025-06-20 NOTE — TELEPHONE ENCOUNTER
Will refill today.  Is she ready for me to order her annual labs?    Thank you,    Navneet Moreno      This document has been electronically signed by Navneet Moreno MD on June 20, 2025 15:02 EDT

## 2025-07-24 ENCOUNTER — TELEPHONE (OUTPATIENT)
Dept: FAMILY MEDICINE CLINIC | Facility: CLINIC | Age: 50
End: 2025-07-24
Payer: COMMERCIAL

## 2025-07-24 NOTE — TELEPHONE ENCOUNTER
Hub to relay & schedule:     Called patient to see if they would like to establish with another provider within the office due to Dr. Moreno leaving the office. LVM

## 2025-07-28 ENCOUNTER — SPECIALTY PHARMACY (OUTPATIENT)
Dept: PHARMACY | Facility: TELEHEALTH | Age: 50
End: 2025-07-28
Payer: COMMERCIAL

## 2025-07-28 ENCOUNTER — OFFICE VISIT (OUTPATIENT)
Dept: CARDIOLOGY | Facility: CLINIC | Age: 50
End: 2025-07-28
Payer: COMMERCIAL

## 2025-07-28 VITALS
DIASTOLIC BLOOD PRESSURE: 85 MMHG | HEIGHT: 66 IN | BODY MASS INDEX: 29.89 KG/M2 | HEART RATE: 67 BPM | SYSTOLIC BLOOD PRESSURE: 123 MMHG | WEIGHT: 186 LBS

## 2025-07-28 DIAGNOSIS — I47.29 NONSUSTAINED VENTRICULAR TACHYCARDIA: ICD-10-CM

## 2025-07-28 DIAGNOSIS — I49.1 PREMATURE ATRIAL COMPLEXES: Primary | ICD-10-CM

## 2025-07-28 PROCEDURE — 99213 OFFICE O/P EST LOW 20 MIN: CPT | Performed by: NURSE PRACTITIONER

## 2025-07-28 NOTE — PROGRESS NOTES
Chief Complaint  Follow-up, Premature atrial complexes, and Hyperlipidemia    Subjective            History of Present Illness  Tali Mcduffie is a 50-year-old female patient who presents to the office today for follow-up.    History of Present Illness  The patient presents for a follow-up of palpitations with PACs and SVT.    She reports a positive response to the increased dosage of metoprolol, which was prescribed by Dr. Preciado during their last consultation. Her current regimen includes metoprolol 25 mg twice daily, which she has been tolerating well. She has made dietary modifications, such as reducing her caffeine intake, limiting herself to one cup of coffee in the morning and occasional green tea. Her primary beverage is water. She also takes magnesium supplements daily, which she believes have been beneficial in managing her palpitations.        PMH  Past Medical History:   Diagnosis Date    Cataract     Cholelithiasis 2000    GERD (gastroesophageal reflux disease)     Glaucoma     Hyperlipidemia     Hypertension     Kidney stone     Premature atrial complexes 10/16/2023         ALLERGY  Allergies   Allergen Reactions    Penicillins Hives and Rash          SURGICALHX  Past Surgical History:   Procedure Laterality Date    APPENDECTOMY      CHOLECYSTECTOMY      COLONOSCOPY      COLONOSCOPY N/A 3/27/2024    Procedure: COLONOSCOPY with biopsy;  Surgeon: Vinny Lyn MD;  Location: [facility] ENDOSCOPY;  Service: General;  Laterality: N/A;  colon polyp    TONSILLECTOMY      WISDOM TOOTH EXTRACTION Bilateral           SOC  Social History     Socioeconomic History    Marital status: Single   Tobacco Use    Smoking status: Never    Smokeless tobacco: Never   Vaping Use    Vaping status: Never Used   Substance and Sexual Activity    Alcohol use: Not Currently     Comment: social    Drug use: Never    Sexual activity: Not Currently     Partners: Male     Birth control/protection: None         FAMHX  Family  "History   Problem Relation Age of Onset    Diabetes Mother     Heart disease Father     Other Sister           FRANCESCA  Current Outpatient Medications on File Prior to Visit   Medication Sig    Atogepant (Qulipta) 60 MG tablet Take 1 tablet by mouth Daily.    atorvastatin (Lipitor) 40 MG tablet Take 1 tablet by mouth Daily.    famotidine (Pepcid) 20 MG tablet Take 1 tablet by mouth Every Night. (Patient taking differently: Take 1 tablet by mouth As Needed.)    magnesium oxide (MAG-OX) 400 MG tablet Take 1 tablet by mouth Daily.    metoprolol succinate XL (TOPROL-XL) 25 MG 24 hr tablet Take 1 tablet by mouth 2 (Two) Times a Day.    omeprazole (priLOSEC) 20 MG capsule Take 1 capsule by mouth Daily.    ondansetron (Zofran) 4 MG tablet Take 1 tablet by mouth Every 8 (Eight) Hours As Needed for Nausea. (Patient not taking: Reported on 7/28/2025)    ubrogepant (Ubrelvy) 100 MG tablet Take 1 tablet by mouth 1 (One) Time As Needed for migraine, may repeat once in 2 hours if needed. max 200 mg per 24 hours (Patient not taking: Reported on 7/28/2025)     No current facility-administered medications on file prior to visit.       Objective   /85   Pulse 67   Ht 167.6 cm (65.98\")   Wt 84.4 kg (186 lb)   BMI 30.04 kg/m²       Physical Exam  HENT:      Head: Normocephalic.   Neck:      Vascular: No carotid bruit.   Cardiovascular:      Rate and Rhythm: Normal rate and regular rhythm.      Pulses: Normal pulses.      Heart sounds: Normal heart sounds. No murmur heard.  Pulmonary:      Effort: Pulmonary effort is normal.      Breath sounds: Normal breath sounds.   Musculoskeletal:      Cervical back: Neck supple.      Right lower leg: No edema.      Left lower leg: No edema.   Skin:     General: Skin is dry.   Neurological:      Mental Status: She is alert and oriented to person, place, and time.   Psychiatric:         Behavior: Behavior normal.         Result Review :   The following data was reviewed by: Karmen LINDSAY" "ADITYA Felix on 07/28/2025:  No results found for: \"PROBNP\"       Lab Results   Component Value Date    TSH 2.210 10/16/2023      Lab Results   Component Value Date    FREET4 0.99 10/16/2023      No results found for: \"DDIMERQUANT\"  Magnesium   Date Value Ref Range Status   05/21/2024 2.0 1.6 - 2.6 mg/dL Final      No results found for: \"DIGOXIN\"   No results found for: \"TROPONINT\"        Results for orders placed in visit on 11/22/23    Adult Transthoracic Echo Complete W/ Cont if Necessary Per Protocol 11/22/2023  8:41 AM    Interpretation Summary  Normal left-ventricular systolic function.  No significant valve abnormalities noted.         Assessment and Plan    Diagnoses and all orders for this visit:    1. Premature atrial complexes (Primary)    2. Nonsustained ventricular tachycardia      Assessment & Plan  1. Palpitations with PACs and SVT:  - Continue metoprolol 25 mg twice daily.  - Continue magnesium 400 mg daily.  - Reduce caffeine intake to help manage palpitations.  - Monitor for any new or worsening symptoms.  - Return for follow-up in 1 year to renew prescriptions.    Follow-up: 07/2026      Follow Up   Return in about 1 year (around 7/28/2026) for Follow up with Dr Preciado.    Patient was given instructions and counseling regarding her condition or for health maintenance advice. Please see specific information pulled into the AVS if appropriate.     Tali Mcduffie  reports that she has never smoked. She has never used smokeless tobacco.        Patient or patient representative verbalized consent for the use of Ambient Listening during the visit with  ADITYA Bruno for chart documentation. 7/28/2025  22:55 EDT    ADITYA Bruno  07/28/25  13:55 EDT    Dictated Utilizing Dragon Dictation    "

## 2025-07-28 NOTE — PROGRESS NOTES
Specialty Pharmacy Patient Management Program  Reassessment     Tali Mcduffie is a 50 y.o. female with migraines and enrolled in the Patient Management program offered by [facility]. A follow-up outreach was conducted, including assessment of continued therapy appropriateness, medication adherence, and side effect incidence and management for Ubrelvy and Qulipta.     Changes to Insurance Coverage or Financial Support  none    Relevant Past Medical History and Comorbidities  Relevant medical history and concomitant health conditions were discussed with the patient. The patient's chart has been reviewed for relevant past medical history and comorbid health conditions and updated as necessary.   Past Medical History:   Diagnosis Date    Cataract     Cholelithiasis 2000    GERD (gastroesophageal reflux disease)     Glaucoma     Hyperlipidemia     Hypertension     Kidney stone     Premature atrial complexes 10/16/2023     Social History     Socioeconomic History    Marital status: Single   Tobacco Use    Smoking status: Never    Smokeless tobacco: Never   Vaping Use    Vaping status: Never Used   Substance and Sexual Activity    Alcohol use: Not Currently     Comment: social    Drug use: Never    Sexual activity: Not Currently     Partners: Male     Birth control/protection: None     Problem list reviewed by Jose Angel Gaming RPH on 7/28/2025 at  2:26 PM    Allergies  Known allergies and reactions were discussed with the patient. The patient's chart has been reviewed for allergy information and updated as necessary.   Allergies   Allergen Reactions    Penicillins Hives and Rash     Allergies reviewed by Jose Angel Gaming RPH on 7/28/2025 at  2:26 PM    Relevant Laboratory Values  Lab Results   Component Value Date    GLUCOSE 103 (H) 05/21/2024    CALCIUM 9.3 05/21/2024     05/21/2024    K 4.0 05/21/2024    CO2 22.7 05/21/2024     05/21/2024    BUN 13 05/21/2024    CREATININE 0.73  05/21/2024    BCR 17.8 05/21/2024    ANIONGAP 11.3 05/21/2024     Lab Results   Component Value Date    WBC 6.37 05/21/2024    HGB 12.7 05/21/2024    HCT 38.6 05/21/2024    MCV 84.5 05/21/2024     05/21/2024     Lab Value Review  The above lab values have been reviewed; the following specialty medication(s) dose adjustment(s) are recommended: none.    Current Medication List  This medication list has been reviewed with the patient and evaluated for any interactions or necessary modifications/recommendations, and updated to include all prescription medications, OTC medications, and supplements the patient is currently taking. This list reflects what is contained in the patient's profile, which has also been marked as reviewed to communicate to other providers it is the most up to date version of the patient's current medication therapy.     Current Outpatient Medications:     Atogepant (Qulipta) 60 MG tablet, Take 1 tablet by mouth Daily., Disp: 30 tablet, Rfl: 5    atorvastatin (Lipitor) 40 MG tablet, Take 1 tablet by mouth Daily., Disp: 90 tablet, Rfl: 1    famotidine (Pepcid) 20 MG tablet, Take 1 tablet by mouth Every Night. (Patient taking differently: Take 1 tablet by mouth As Needed.), Disp: 90 tablet, Rfl: 0    magnesium oxide (MAG-OX) 400 MG tablet, Take 1 tablet by mouth Daily., Disp: , Rfl:     metoprolol succinate XL (TOPROL-XL) 25 MG 24 hr tablet, Take 1 tablet by mouth 2 (Two) Times a Day., Disp: 60 tablet, Rfl: 11    omeprazole (priLOSEC) 20 MG capsule, Take 1 capsule by mouth Daily., Disp: 30 capsule, Rfl: 3    ondansetron (Zofran) 4 MG tablet, Take 1 tablet by mouth Every 8 (Eight) Hours As Needed for Nausea. (Patient not taking: Reported on 7/28/2025), Disp: 14 tablet, Rfl: 0    ubrogepant (Ubrelvy) 100 MG tablet, Take 1 tablet by mouth 1 (One) Time As Needed for migraine, may repeat once in 2 hours if needed. max 200 mg per 24 hours (Patient not taking: Reported on 7/28/2025), Disp: 10  tablet, Rfl: 5  Medicines reviewed by Jose Angel Gaming RPH on 7/28/2025 at  2:26 PM    Drug Interactions  none     Adverse Drug Reactions  Medication tolerability: Tolerating with no to minimal ADRs  Medication plan: Continue therapy with normal follow-up  Plan for ADR Management: none    Hospitalizations and Urgent Care Since Last Assessment  Hospitalizations or Admissions: none  ED Visits: none  Urgent Office Visits: none     Adherence, Self-Administration, and Current Therapy Problems  Adherence related to the patient's specialty therapy was discussed with the patient. The Adherence segment of this outreach has been reviewed and updated.     Adherence Questions  Linked Medication(s) Assessed: Atogepant (Qulipta), Ubrogepant (UBRELVY)  On average, how many doses/injections does the patient miss per month?: 0 (Ubrelvy PRN medication)  What are the identified reasons for non-adherence or missed doses? : no problems identified  What is the estimated medication adherence level?: %  Based on the patient/caregiver response and refill history, does this patient require an MTP to track adherence improvements?: no    Additional Barriers to Patient Self-Administration: none  Methods for Supporting Patient Self-Administration: none    Open Medication Therapy Problems  No medication therapy recommendations to display    Goals of Therapy  Goals related to the patient's specialty therapy were discussed with the patient. The Patient Goals segment of this outreach has been reviewed and updated.   Goals Addressed Today        Specialty Pharmacy General Goal      Decrease frequency, severity and duration of migraine attacks by 50%              Progress Toward Meeting Patient-Identified Goals of Therapy: On Track  New Patient-Identified Goals, If Applicable:     Progress Toward Meeting Clinical Goals or Therapeutic Targets: On Track  New Clinical Goals or Therapeutic Targets, If Applicable:     Quality of Life Assessment    Quality of Life related to the patient's enrollment in the patient management program and services provided was discussed with the patient. The QOL segment of this outreach has been reviewed and updated.  Quality of Life Improvement Scale: 9-A good deal better    Reassessment Plan & Follow-Up  Medication Therapy Changes: none  Additional Plans, Therapy Recommendations, or Therapy Problems to Be Addressed: none   Pharmacist to perform regular reassessments no more than (6) months from the previous assessment.  Care Coordinator to set up future refill outreaches, coordinate prescription delivery, and escalate clinical questions to pharmacist.     Attestation  I attest the patient was actively involved in and has agreed to the above plan of care. I attest that the specialty medication(s) addressed above are appropriate for the patient based on my reassessment. If the prescribed therapy is at any point deemed not appropriate based on the current or future assessments, a consultation will be initiated with the patient's specialty care provider to determine the best course of action. The revised plan of therapy will be documented along with any required assessments and/or additional patient education provided.     Electronically signed by Jose Angel Gaming RPH, 07/28/25, 2:28 PM EDT.

## 2025-07-30 ENCOUNTER — TELEPHONE (OUTPATIENT)
Dept: FAMILY MEDICINE CLINIC | Facility: CLINIC | Age: 50
End: 2025-07-30

## 2025-08-01 DIAGNOSIS — Z12.31 SCREENING MAMMOGRAM FOR BREAST CANCER: Primary | ICD-10-CM

## 2025-08-22 ENCOUNTER — SPECIALTY PHARMACY (OUTPATIENT)
Dept: PHARMACY | Facility: TELEHEALTH | Age: 50
End: 2025-08-22
Payer: COMMERCIAL

## (undated) DEVICE — Device

## (undated) DEVICE — SOL IRRG H2O PL/BG 1000ML STRL

## (undated) DEVICE — GLV SURG BIOGEL LTX PF 7 1/2

## (undated) DEVICE — Device: Brand: DEFENDO AIR/WATER/SUCTION AND BIOPSY VALVE

## (undated) DEVICE — LINER SURG CANSTR SXN S/RIGD 1500CC

## (undated) DEVICE — SOLIDIFIER LIQLOC PLS 1500CC BT

## (undated) DEVICE — SINGLE-USE BIOPSY FORCEPS: Brand: RADIAL JAW 4

## (undated) DEVICE — SOL IRR NACL 0.9PCT BT 1000ML

## (undated) DEVICE — CONN JET HYDRA H20 AUXILIARY DISP